# Patient Record
Sex: FEMALE | HISPANIC OR LATINO | URBAN - METROPOLITAN AREA
[De-identification: names, ages, dates, MRNs, and addresses within clinical notes are randomized per-mention and may not be internally consistent; named-entity substitution may affect disease eponyms.]

---

## 2023-03-23 ENCOUNTER — ULTRASOUND (OUTPATIENT)
Dept: OBGYN CLINIC | Facility: CLINIC | Age: 33
End: 2023-03-23

## 2023-03-23 VITALS
DIASTOLIC BLOOD PRESSURE: 78 MMHG | RESPIRATION RATE: 16 BRPM | HEIGHT: 60 IN | HEART RATE: 100 BPM | SYSTOLIC BLOOD PRESSURE: 120 MMHG | WEIGHT: 212 LBS | BODY MASS INDEX: 41.62 KG/M2

## 2023-03-23 DIAGNOSIS — N91.2 AMENORRHEA: Primary | ICD-10-CM

## 2023-03-23 NOTE — PROGRESS NOTES
Wendy 50  Name: Danny Yang  MRN: [id number]  : 1990      ASSESSMENT/PLAN:    #1  IUP at 9 weeks and 5 days  - Viable pregnancy on TVUS  - Prenatal panel, Hep C and 1h GTT ordered  - MFM referral placed  - RTC in 2 weeks for nurse intake visit      SUBJECTIVE:   29 y o   who presents for viability scan with LMP of 1/15/23  She is 9 weeks and 5 days by her LMP  She denies cramping or vaginal bleeding  This is a planned and welcomed pregnancy  Her previous pregnancy resulted in a  section  OBJECTIVE:  Vitals:    23 1023   BP: 120/78   BP Location: Right arm   Patient Position: Sitting   Cuff Size: Standard   Pulse: 100   Resp: 16   Weight: 96 2 kg (212 lb)   Height: 5' (1 524 m)       TVUS: viable, camargo IUP at 9 weeks 1 days with CRL 5 61cm    ANGELA Oct 27th 2023  Final dating via LMP                    Meche Vicente MD  OB/GYN PGY-3  3/23/2023  10:34 AM

## 2023-03-24 ENCOUNTER — TELEPHONE (OUTPATIENT)
Facility: HOSPITAL | Age: 33
End: 2023-03-24

## 2023-03-24 NOTE — TELEPHONE ENCOUNTER
Called patient to schedule MFM appointment, based on referral issued to Maternal Fetal Medicine by [facility] office  Left voicemail requesting patient to call back and schedule appointment, with office number for return call [phone]

## 2023-03-27 ENCOUNTER — INITIAL PRENATAL (OUTPATIENT)
Dept: OBGYN CLINIC | Facility: CLINIC | Age: 33
End: 2023-03-27

## 2023-03-27 VITALS
HEIGHT: 60 IN | WEIGHT: 214.2 LBS | HEART RATE: 98 BPM | BODY MASS INDEX: 42.05 KG/M2 | DIASTOLIC BLOOD PRESSURE: 78 MMHG | SYSTOLIC BLOOD PRESSURE: 115 MMHG

## 2023-03-27 DIAGNOSIS — O99.211 OBESITY AFFECTING PREGNANCY IN FIRST TRIMESTER: Primary | ICD-10-CM

## 2023-03-27 NOTE — LETTER
[facility] Letter    03/27/23        Dee Dee Kovacs  1990  [address]  [facility]       Francy Paz is a patient and under our care in our office  Dee Dee Kovacs's Estimated Date of Delivery: 10/22/2023  Any questions or concerns feel free to contact our office           Respectfully,    [address] [facility]  [id number] [place] [address]/Dary Richardson 15  [address] [facility]/[place]  [phone]   [facility]/[address]  [phone]

## 2023-03-27 NOTE — LETTER
Proof of Pregnancy Letter      03/27/23            Radha Saenz  1990  [address]  [facility]      Radha Saenz is a patient at our facility  Radha Saenz Estimated Date of Delivery: 10/22/2023  Any questions or concerns, please feel free to contact our office        Sincerely,      [facility]  [address], [address] [facility], [address]   Office:  [phone]

## 2023-03-27 NOTE — LETTER
Dentist Letter            03/27/23          Susanne Mediate  1990  [address]  [facility]  [facility]               We have had several requests from local dentist requesting permission to perform procedures on our patients who are pregnant  We wish to respond with this letter regarding some of the more routine procedures that we have been asked about  The following procedures may be performed on our obstetric patients:   1  Administration of local anesthesia   2  Administration of antibiotics such as PCN, Ampicillin, and Erythromycin  3  Administration of pain medications such as Tylenol, Tylenol with codeine, and if needed Percocet  4  Shielded X-rays    Should you have any questions, please do not hesitate to contact at [phone]          Sincerely,    [facility] Team  [phone]

## 2023-03-27 NOTE — LETTER
Work Letter    03/27/23      Ken Fraga  1990  [address] [address]    Dear Ken Fraga,      Your employee is a patient at [facility]   We recommend that all pregnant women:    1  Have a well-ventilated workspace  2  Wear low-heeled shoes  3  Work no more than 40 hours per week  4  Have a 15 minute break every 2 hours and at least 30 minutes for a meal break  5  Use good body mechanics by bending at your knees to avoid back strain and lift no more than 20 pounds without assistance  Will need assistance with lifting over 20 lbs  6  Have ready access to bathrooms and water  She may continue to work until her due date unless medical complications arise  We anticipate she may return to work in 6-8 weeks after delivery       Sincerely,  [facility]  [address], [address] [place] [address]  DeanDimitry   6   OFFICE:  [phone]    OR  [address] [facility], [id number] [place] [address]  OFFICE:  [phone]

## 2023-03-27 NOTE — PATIENT INSTRUCTIONS
7 [facility] would like to say Congratulations on your pregnancy! We are happy that you have chosen us to be your health care provider during your pregnancy  Your health, the health of your unborn child (children) and your family is important to us  Now, more than ever, your health will be most important to you  Your baby's growth and progress can be affected by how well you take care of yourself  It's a good idea to plan ahead  It is a known fact that women who receive care early in pregnancy and throughout their pregnancy have healthier babies  Visits will be scheduled for you throughout your pregnancy  It is your duty to keep your appointments and follow directions for your care  The number of visits will be decided by your doctor  Don't be afraid to ask questions  Talk with your nurses and providers about your plans for birth and any concerns you may have  Maternal Fetal Medicine (MFM) at [phone]   1 hour appointment, only 1 support person allowed, NO children    Blood work : Please complete prior to your H&P appointment  You do NOT have to fast for any blood work unless instructed  CBC, Blood type and antibody screen, Urinalysis, Random glucose level, HIV, Syphilis, Hepatitis B  History &Physical: H&P appointment   Physical exam  Pelvic exam: GC/CT testing  If needed: Pap smear  Routine prenatal   Visits every 4 weeks until 28 weeks    Stay healthy during your pregnancy    Eat a variety of healthy foods  Healthy foods include fruits, vegetables, whole-grain breads, low-fat dairy foods, beans, lean meats, and fish  Drink liquids as directed  Ask how much liquid to drink each day and which liquids are best for you  Caffeine: It is not clear how caffeine affects pregnancy  Limit your intake of caffeine to avoid possible health problems  Limit caffeine to less than 200 milligrams each day     Caffeine may be found in coffee, tea, cola, sports drinks, and chocolate  Foods that contain mercury:  Mercury is naturally found in almost all types of fish and shellfish  Some types of fish absorb higher levels of mercury that can be harmful to an unborn baby  Eat only fish and shellfish that are low in mercury  Limit your intake of fish to 2 servings each week  Choose fish low in mercury such as canned light tuna, shrimp, crab, salmon, cod, or tilapia  Do not  eat fish high in mercury such as swordfish, tilefish, aidan mackerel, and shark  Each week, you may eat up to 12 ounces of fish or shellfish that have low levels of mercury  These include shrimp, canned light tuna, salmon, pollock, and catfish  Eat only 6 ounces of albacore (white) tuna per week  Albacore tuna has more mercury than canned tuna  Take prenatal vitamins as directed  Your need for certain vitamins and minerals, such as folic acid, increases during pregnancy  Prenatal vitamins provide some of the extra vitamins and minerals you need  Prenatal vitamins may also help to decrease the risk of certain birth defects  Weight: Ask how much weight you should gain during your pregnancy  Too much or too little weight gain can be unhealthy for you and your baby  Exercise: Talk to your healthcare provider about exercise  Moderate exercise can help you stay fit  Your healthcare provider will help you plan an exercise program that is safe for you during pregnancy  Drink plenty of fluids while exercising to stay hydrated  Be careful to avoid overheating  AVOID exercises that can make you lose your balance  Activities that can put your baby at risk i e  horseback riding, scuba diving, skiing or snowboarding  After your first trimester, avoid exercises that require you to lay flat on your back  AVOID exceeding a heart rate greater than 140 beats per minute  As long as you are able to hold a conversation while exercising your heart rate is likely acceptable  ALWAYS wear your seat belt    The shoulder belt should lay across your chest (between your breasts) and away from your neck  Secure the lap belt below your belly so that it fits snugly across your hips and pelvic bone  Perform Kegel exercise  Imagine yourself stopping the flow of urine, parth the muscles of your pelvic floor  Hold that contraction for 10 seconds and release  They can be repeated 10-20 times per day  Do not smoke  If you smoke, it is never too late to quit  Smoking increases your risk of a miscarriage and other health problems during your pregnancy  Smoking can cause your baby to be born too early or weigh less at birth  Ask your healthcare provider for information if you need help quitting  Do not drink alcohol  Alcohol passes from your body to your baby through the placenta  It can affect your baby's brain development and cause fetal alcohol syndrome (FAS)  FAS is a group of conditions that causes mental, behavior, and growth problems  Alcohol:  Do not drink alcohol during pregnancy  Alcohol can increase your risk of a miscarriage (losing your baby)  Never use illegal or street drugs (such as marijuana or cocaine) while you are pregnant  Safety tips:  Avoid hot tubs and saunas  Do not use a hot tub or sauna while you are pregnant, especially during your first trimester  Hot tubs and saunas may raise your baby's temperature and increase the risk of birth defects  Avoid toxoplasmosis  This is an infection caused by eating raw meat or being around infected cat feces  It can cause birth defects, miscarriages, and other problems  Wash your hands after you touch raw meat  Make sure any meat is well-cooked before you eat it  Avoid raw eggs and unpasteurized milk  Use gloves or ask someone else to clean your cat's litter box while you are pregnant  Ask your healthcare provider about travel  The most comfortable time to travel is during the second trimester   Ask your healthcare provider if you can travel after 36 weeks  You may not be able to travel in an airplane after 36 weeks  He may also recommend that you avoid long road trips  Pregnancy Diet  WHAT YOU NEED TO KNOW:   What is a healthy diet during pregnancy? A healthy diet during pregnancy is a meal plan that provides the amount of calories and nutrients you need during pregnancy  Your body needs extra calories and nutrients to support your growing baby  You need to gain the right amount of weight for a healthy baby and pregnancy  Babies born at a healthy weight have a lower risk of certain health problems at birth and later in life  A healthy diet may help you avoid gaining too much weight  Too much weight gain may cause problems for you during your pregnancy and delivery  What should I AVOID while I am pregnant? Raw and undercooked foods: You should not eat undercooked or raw meat, poultry, eggs, fish, or shellfish (shrimp, crab, lobster)  Cook leftover foods and ready-to-eat foods such as hot dogs until they are steaming hot  Unpasteurized food:  Unpasteurized foods are foods that have not gone through the heating process (pasteurization) that destroys bacteria  You should not drink milk, juice, or cheese that has not been pasteurized  This includes Brie, feta, Camembert, blue, and [place] cheeses  Which foods can I eat while I am pregnant? Eat a variety of foods from each of the food groups listed below  Your dietitian will tell you how many servings you should have from each food group each day to get enough calories  The amount of calories you need depends on your daily activity, your weight before pregnancy, and current weight gain  Healthcare providers divide pregnancy into 3 periods of time called trimesters  In the first trimester, you usually do not need extra calories  In the second and third trimesters, most women should eat about 300 extra calories each day  What vitamin and mineral supplements may I need?   Your healthcare provider will tell you if you need a supplement and the type you should take  Talk to your healthcare provider before you take any other kind of supplement, including herbal (natural) supplements  Prenatal vitamins:  Eat a variety of healthy foods, even if you take a prenatal vitamin  If you forget to take your vitamin, do not take double the amount the next day  Folic acid:  You need at least 785 mcg of folic acid each day before you get pregnant  Folic acid helps to form your baby's brain and spinal cord in early pregnancy  During pregnancy, your daily need for folic acid increases to about 600 mcg  Get folic acid each day by eating citrus fruits and juices, green leafy vegetables, liver, or dried beans  Folic acid is also added to foods such as breakfast cereals, bread products, flour, and pasta  Iron:  Iron is a mineral the body needs to make hemoglobin, which is a part of red blood cells  Hemoglobin helps your blood carry oxygen from the lungs to the rest of your body  Foods that are good sources of iron are meat, poultry, fish, beans, spinach, and fortified cereals and breads  Your body will absorb iron better from non-meat sources if you have a source of vitamin C at the same time  Drink tea and coffee separately from iron-fortified foods and iron supplements  You need about 30 mg of iron each day during pregnancy  Calcium and vitamin D:  Women who do not eat dairy products may need a calcium and vitamin D supplement  Talk to your healthcare provider about calcium supplements if you do not regularly eat good sources of calcium  The amount of calcium you need is about 1,300 mg if you are between 15and 25years old and 1,000 mg if you are 23to 48years old  What other healthy guidelines should I follow? Vegetarians and vegans: If you are a vegetarian or vegan, get enough protein, vitamin B12, and iron during your pregnancy   Some non-meat sources of these nutrients are fortified cereals, nut butters, soy products (tofu and soymilk), nuts, grains, and legumes  These nutrients are also found in eggs and milk products  Cravings: You may have cravings for certain foods during your pregnancy  Foods that are high in calories, fat, and sugar should not replace healthy food choices  Some women have cravings for unusual substances such as tawana, dirt, laundry starch, ice, and chalk  This condition is called pica  These may lead to health problems such as anemia and cause other health problems  Nausea and Vomiting in Pregnancy  Nausea and vomiting in pregnancy  can happen any time of day  These symptoms usually start before the 9th week of pregnancy, and end by the 14th week (second trimester)  Some women can have nausea and vomiting for a longer time  These symptoms can affect some women throughout the entire pregnancy  Nausea and vomiting do not harm your baby  These symptoms can make it hard for you to do your daily activities  Seek care immediately if:   You have signs of dehydration  Examples are dark yellow urine, dry mouth and lips, dry  skin, fast heartbeat, and urinating less than usual   You have severe abdominal pain  You feel too weak or dizzy to stand up  You see blood in your vomit or bowel movements  Contact your healthcare provider if:   You vomit more than 4 times in 1 day  You have not been able to keep liquids down for more than 1 day  You lose more than 2 pounds  You have a fever  Your nausea and vomiting continue longer than 14 weeks  You have questions or concerns about your condition or care  Treatment  for nausea and vomiting in pregnancy is usually not needed  Talk to your healthcare provider if your symptoms do not decrease with the changes suggested below  You may need vitamin B6 and medicine if these changes do not help, or your symptoms become severe  Nutrition changes you can make to manage nausea and vomiting:   Eat small meals throughout the day instead of 3 large meals  You may be more likely to have nausea and vomiting when your stomach is empty  Eat foods that are low in fat and high in protein  Examples are lean meat, beans, turkey, and chicken without the skin  Eat a small snack, such as crackers, dry cereal, or a small sandwich before you go to bed  Eat some crackers or dry toast before you get out of bed in the morning  Get out of bed slowly  Sudden movements could cause you to get dizzy and nauseated  Eat bland foods when you feel nauseated  Examples of bland foods include dry toast, dry cereal, plain pasta, white rice, and bread  Other bland foods include saltine crackers, bananas, gelatin, and pretzels  Avoid spicy, greasy, and fried foods  Avoid any other foods that make you feel nauseated  Drink liquids that contain ginger  Drink ginger ale made with real ginger or ginger tea made with fresh grated ginger  Ginger capsules or ginger candies may also help to decrease nausea and vomiting  Drink liquids between meals instead of with meals  Wait at least 30 minutes after you eat to drink liquids  Drink small amounts of liquids often throughout the day to prevent dehydration  Ask how much liquid you should drink each day  Other changes you can make to manage nausea and vomiting:   Avoid smells that bother you  Strong odors may cause nausea and vomiting to start, or make it worse  Take a short walk, turn on a fan, or try to sleep with the window open to get fresh air  When you are cooking, open windows to get rid of smells that may cause nausea  Do not brush your teeth right after you eat  if it makes you nauseated  Rest when you need to  Start activity slowly and work up to your usual routine as you start to feel better  Talk to your healthcare provider about your prenatal vitamins  Prenatal vitamins can cause nausea for some women  Try taking your prenatal vitamin at night or with a snack   If this change does not help, your healthcare provider may recommend a different type of vitamin  Do not use any medicines, vitamins, or supplements to manage your symptoms without asking your healthcare provider  Many medicines can harm an unborn baby  Light to moderate exercise  may help to decrease your symptoms  It may also help you to sleep better at night  Ask your healthcare provider about the best exercise plan for you  Breastfeeding   Benefits of breastfeeding  Are less likely to develop allergies  Have increased protection against bacterial meningitis  Have fewer middle ear infections  Have fewer learning disabilities  Have fewer behavioral difficulties  Have higher IQ's  Have lower rates of respiratory illness  Have lower obesity  Are less likely to develop juvenile diabetes and some childhood cancers  Are less likely to die from Sudden Infant Death Syndrome  A healthier baby means fewer visits to the doctor and the pharmacy  Breastfeeding is environmentally friendly  There is nothing to throw away  Breastfeeding is free; formula can cost over $1000 for the first year of life  There is less vaginal bleeding immediately after delivery and a faster return to your pre-pregnant size  Mothers who breastfeed a lifetime total of 2 years have:  A 40% decreased risk of breast cancer   A decreased risk of ovarian cancer  Lower rates of osteoporosis, diabetes and heart disease  Importance of exclusive breastfeeding  By providing the ideal food for the healthy growth and development of infants, exclusive  infants receive only breast milk  Exclusive breastfeeding for the first 6 months is very important to improve an infant's health and reduce childhood illness  Exclusive breastfeeding for the first 6 months  The American Academy of Pediatrics recommends exclusive breastfeeding for the first six months and continued breastfeeding with supplementation of solids for the next 6 months       Early initiation of breastfeeding  Women who feed their infants within the first hour of birth is referred to as Meghan Babb initiation of breastfeeding” and ensures that the  receives colostrum  Colostrum is rich in antibodies and essential nutrients  Rooming-In  May stabilize 's breathing and heart rate  Reduce crying  Improve ability for  to maintain temperature and blood sugar levels  Early stimulation of baby's immune system  Calming effects  Easier and faster bonding   Rooming-in promotes getting to know your   Rooming-in makes breastfeeding easier  Women who room-in with their newborns make more milk and produce a good milk supply earlier  Becomes easier to recognize baby's feeding cues  Infants have longer breastfeeding sessions  Women who room-in with their newborns are more likely to exclusively breastfeed compared to women who are  from their newborns  Skin-to-Skin  Skin to skin contact should happen regardless of a woman's feeding preference or the mode of delivery  After the delivery of your baby, it's important that a healthy mother and her baby have uninterrupted skin-to-skin contact  Skin to skin contact should occur immediately after birth for a least one-two hours and until after the first feeding for breastfeeding mothers  Skin-to-skin contact means placing dried, unclothes newborns on their mother's bare chest, with warm blankets covering the baby's back  All routine procedures such as maternal and  assessments can take place during skin-to-skin contact or can be delayed until after the sensitive period immediately after birth  We are proud to offer extensive educational and support services to encourage mothers to breastfeed  This service offers information, education, and support for women who want to breast feed  Mothers can leave a message or breastfeeding question on the line anytime of the day  Nurses will respond within 72 hours  The Breast Feeding Answer Line is [phone] ([phone])   Please DO NOT leave urgent or emergent messages on this message line  Please DO contact your physician DIRECTLY if you have any urgent questions or concerns  In the event of a suspected emergency medical condition please CALL 911 or [address]      Attaching your baby at the Breast (English): [email or web address]    Attaching your baby at the Breast (Japanese):  [email or web address]        Coronavirus (COVID-19) pregnancy FAQs: Medical experts answer your questions  From [employer] com cy  [email or web address]-covid-19-pregnancy-faq-medical-yidpxle-iwxzch-[email or web address]  bc (courtesy of [facility])  As of 3/18/20  By Eugenio Blair 41  Medically reviewed by Society for Maternal-Fetal Medicine       We asked parents-to-be to send us their most pressing questions about coronavirus (COVID-19)  Among them: Is it still safe to deliver in a hospital? What if my ob-gyn has the virus? We sent those questions to the top docs at the Society for Maternal-Fetal Medicine  Here are their answers  The coronavirus (COVID-19) pandemic has been declared a national emergency in the [facility] by [employer]  Moms-to-be like you are concerned about everything from getting medicines to managing disruptions at work  But above and beyond any worry about lifestyle changes is a focus on your health and the impact of COVID-19 on your pregnancy  We asked obstetrics doctors who handle the most complicated pregnancy issues to answer your questions about the coronavirus  Here are their responses, provided by Dr Justino Ardon and her colleagues at the Society for [place] [address]  Am I at more risk for COVID-19 if I'm pregnant? We don't know   Pregnancy does change your immune system in ways that might make you more susceptible to viral respiratory infections like COVID-19  If you become infected, you might also be at higher risk for more severe illness compared to the general population  Although this does not appear to be the case with COVID-19, based on limited data so far, a higher risk has been true for pregnant women with other coronavirus infections (SARS-CoV and MERS-CoV) and other viral respiratory infections, such as flu  It's important to take preventive actions to avoid infection, such as washing your hands often and avoiding people who are sick  How might coronavirus affect my pregnancy? Again, we don't know  Women with other coronavirus infections (such as SARS-CoV) did not have miscarriage or stillbirth at higher rates than the general population  We know that having other respiratory viral infections during pregnancy, such as flu, has been associated with problems like low birth weight and  birth  Also, having a high fever early in pregnancy may increase the risk of certain birth defects  Could I transmit coronavirus to my baby during pregnancy or delivery? We don't know whether you could transmit COVID-19 to your baby before or during delivery  However, among the few case studies of infants born to mothers with COVID-23 published in peer-reviewed literature, none of the infants tested positive for the virus  Also, there was no virus detected in samples of amniotic fluid or breast milk  There have been a few reports of newborns as young as a few days old with infection, suggesting that a mother can transmit the infection to her infant through close contact after delivery  There have been no reports of mother-to-baby transmission for other coronaviruses (MERS-CoV and SARS-CoV), although only limited information is available  Is it safe for me to deliver at a hospital where there have been COVID-19 cases? Yes  We know that COVID-19 is a very scary virus   The good news is that hospitals are taking great precautions to keep patients and healthcare providers safe  When a patient is even suspected to have COVID-19, they are placed in a negative pressure room  (Think of these rooms as vacuums that suck and filter the air so it's safe for the other people in the hospital)  This makes it possible for you to deliver at the hospital without putting you or your baby at risk  Hospitals are also implementing stricter visiting policies to keep patients safe  It's worth calling your hospital to check if there are any new regulations to be aware of  What plans should I make now in case the hospital system is overwhelmed when it's time for me to deliver? This is a great question to talk with your doctor or midwife about when you're 34 to 36 weeks pregnant  Every hospital is making different plans for dealing with this scenario  I work in healthcare  Should I ask my doctor to excuse me from work until the baby is born? What if I work in a school, the travel [place], or some other high-risk setting? Healthcare facilities should take care to limit the exposure of pregnant employees to patients with confirmed or suspected COVID-19, just as they would with other infectious cases  If you continue working, be sure to follow the CDC's risk assessment and infection control guidelines  If you work in a school, travel [place], or other high-risk setting, talk with your employer about what it's doing to protect employees and minimize infection risks  Wash your hands often  What if my OB gets COVID-19? If your doctor or midwife tests positive for COVID-19, they will need to be quarantined until they recover and are no longer at risk of transmitting the virus  In this case, you'll be assigned to another OB in your doctor's practice (or you may choose another practitioner yourself)  Ask your new OB or your doctor's office if you should self-quarantine or be tested for the virus   (It will depend on when you last saw your provider and when that person tested positive )    Should we hold off on trying to conceive because of COVID-19? At this time, there's no reason to hold off on trying to get pregnant, but the data we have is really limited  For example, we don't think the virus causes birth defects or increases your risk of miscarriage  But we don't know whether you could transmit COVID-19 to your baby before or during delivery  We also don't know if the virus lives in semen or can be sexually transmitted  We have a babymoon scheduled in the next few months - should we cancel? If you're planning to travel internationally or on a cruise, you should strongly consider canceling  At this time, the virus has reached more than 140 countries, and there are travel bans to [place], most of [place], and [place]  Places where large numbers of people gather are at highest risk, especially airports and cruise ships  If you're planning travel in the [place] , note that any travel setting increases your risk of exposure, and there may be travel bans even in [facility] by the time you're scheduled to go  Even if you're state is not blocked, you'll definitely want to avoid traveling to communities where the virus is spreading  To find out where these places are, check The [place] Times map based on CDC data  For the most current advice to help you avoid exposure, check the CDC's COVID-19 travel page  Will the hospital separate me from my  and keep the baby in quarantine? If you test positive for COVID-19 or have been exposed but have no symptoms, the CDC, [employer] of Obstetricians and Gynecologists, and the Society for [employer] all recommend that you be  from your baby to decrease the risk of transmission to the baby  This would last until you are no longer at risk of transmitting the virus   If you do not have COVID-19 and have not been exposed to the virus, the hospital will not separate you from your   My hospital is restricting visitors and only allowing one support person  If my support person leaves after the delivery, will they be allowed to come back? Every hospital has different policies  Contact your hospital or labor and delivery unit a week or so before delivery to get the most up-to-date restrictions  In general, if your support person needs to leave, they would be allowed back unless they knew they were exposed to COVID-19 after leaving your company  Sree understands that the coronavirus (COVID-19) pandemic is an evolving story and that your questions will change over time  We'll continue asking moms and dads in our Community what they want to know, and we'll get the answers from experts to keep them - and you - informed and supported  My mom was planning to fly here to help me care for my new baby after delivery  Should I tell her not to come? Yes  If your mom is over 61 or has any serious chronic medical conditions (such as heart disease, lung disease, or diabetes), she is at higher risk of serious illness from COVID-19 and should avoid air travel  And remember that any travel setting increases a person's risk of exposure  So, it may be risky to have her around the baby after she has been traveling  For the most current advice on traveling, check the CDC's COVID-19 travel page  Sree understands that the coronavirus pandemic is an evolving story and that your questions will change over time  We'll continue asking moms and dads in our Community what they want to know, and we'll get the answers from experts to keep them - and you - informed and supported

## 2023-03-27 NOTE — PROGRESS NOTES
OB INTAKE INTERVIEW  Pt presents for OB intake  [id number]  OB History    Para Term  AB Living   4 1 1   2 1   SAB IAB Ectopic Multiple Live Births     1 1   1      # Outcome Date GA Lbr Eliazar/2nd Weight Sex Delivery Anes PTL Lv   4 Current            3 Term 19    F CS-Unspec   JACKIE   2 IAB            1 Ectopic 2014             Hx of  delivery prior to 36 weeks 6 days:  No   If yes, place a referral for cervical surveillance at 16 weeks  Last Menstrual Period:    Patient's last menstrual period was 01/15/2023 (exact date)  Ultrasound date: 3/23/2023  9 weeks 1 days     Estimated Date of Delivery: 10/22/2023 by LMP  H&P visit scheduled  2023 @ 1115  with Dr Anu Escalante     Last pap smear: unknown date      Current Issues:  Constipation :   Yes, denied blood  RN educated pt on dietary changes   Headaches :   No  Cramping:  No  Spotting :   No  PICA cravings :  No  FOB Involved:   Yes  Planned pregnancy:  Yes  I have these concerns about this prenatal patient:   Obesity affecting pregnancy - Referral placed for Nutritionist   History of Depression - referral placed for [facility]     Interview education  • [facility] Pregnancy Essentials reviewed and discussed   • Baby and [address] Handout  • [facility] MFM Handout  • Discussed genetic testing - pt is interested at this time   • Prenatal lab work: Scripts printed and given to pt  • Influenza vaccine given today: No, pt refused   • Discussed COVID vaccine - per pt received 2 doses of Pfizer   • Discussed Tdap vaccine  Immunizations: There is no immunization history on file for this patient  Depression Screening Follow-up Plan: Patient's depression screening was negative with an Revillo score of  8  Patient advised to follow-up with PCP for further management  Nurse/Family Partnership- referral placed:  No   If yes, place referral for nurse family partnership  BMI Counseling: Body mass index is 41 83 kg/m²   is above normal  Patient referred to nutritionist due to patient being overweight  Tobacco Cessation Counseling: non-smoker  The numerous health risks of tobacco consumption were discussed  Infection Screening: Does the pt have a hx of MRSA? No  If yes- please follow MRSA protocol and obtain a nasal swab for MRSA culture  The patient was oriented to our practice and all questions were answered    Interviewed by: Yogi Noguera RN 03/27/23

## 2023-04-04 ENCOUNTER — APPOINTMENT (OUTPATIENT)
Dept: LAB | Facility: CLINIC | Age: 33
End: 2023-04-04

## 2023-04-04 DIAGNOSIS — N91.2 AMENORRHEA: ICD-10-CM

## 2023-04-04 LAB
ABO GROUP BLD: NORMAL
BACTERIA UR QL AUTO: ABNORMAL /HPF
BASOPHILS # BLD AUTO: 0.02 THOUSANDS/ÂΜL (ref 0–0.1)
BASOPHILS NFR BLD AUTO: 0 % (ref 0–1)
BILIRUB UR QL STRIP: NEGATIVE
BLD GP AB SCN SERPL QL: NEGATIVE
CLARITY UR: CLEAR
COLOR UR: ABNORMAL
EOSINOPHIL # BLD AUTO: 0.04 THOUSAND/ÂΜL (ref 0–0.61)
EOSINOPHIL NFR BLD AUTO: 0 % (ref 0–6)
ERYTHROCYTE [DISTWIDTH] IN BLOOD BY AUTOMATED COUNT: 13 % (ref 11.6–15.1)
GLUCOSE 1H P 50 G GLC PO SERPL-MCNC: 155 MG/DL (ref 40–134)
GLUCOSE UR STRIP-MCNC: ABNORMAL MG/DL
HBV SURFACE AG SER QL: NORMAL
HCT VFR BLD AUTO: 38.5 % (ref 34.8–46.1)
HCV AB SER QL: NORMAL
HGB BLD-MCNC: 13.6 G/DL (ref 11.5–15.4)
HGB UR QL STRIP.AUTO: NEGATIVE
HIV 1+2 AB+HIV1 P24 AG SERPL QL IA: NORMAL
HIV 2 AB SERPL QL IA: NORMAL
HIV1 AB SERPL QL IA: NORMAL
HIV1 P24 AG SERPL QL IA: NORMAL
HYALINE CASTS #/AREA URNS LPF: ABNORMAL /LPF
IMM GRANULOCYTES # BLD AUTO: 0.04 THOUSAND/UL (ref 0–0.2)
IMM GRANULOCYTES NFR BLD AUTO: 0 % (ref 0–2)
KETONES UR STRIP-MCNC: NEGATIVE MG/DL
LEUKOCYTE ESTERASE UR QL STRIP: NEGATIVE
LYMPHOCYTES # BLD AUTO: 2.42 THOUSANDS/ÂΜL (ref 0.6–4.47)
LYMPHOCYTES NFR BLD AUTO: 22 % (ref 14–44)
MCH RBC QN AUTO: 31.9 PG (ref 26.8–34.3)
MCHC RBC AUTO-ENTMCNC: 35.3 G/DL (ref 31.4–37.4)
MCV RBC AUTO: 90 FL (ref 82–98)
MONOCYTES # BLD AUTO: 0.42 THOUSAND/ÂΜL (ref 0.17–1.22)
MONOCYTES NFR BLD AUTO: 4 % (ref 4–12)
MUCOUS THREADS UR QL AUTO: ABNORMAL
NEUTROPHILS # BLD AUTO: 8.03 THOUSANDS/ÂΜL (ref 1.85–7.62)
NEUTS SEG NFR BLD AUTO: 74 % (ref 43–75)
NITRITE UR QL STRIP: NEGATIVE
NON-SQ EPI CELLS URNS QL MICRO: ABNORMAL /HPF
NRBC BLD AUTO-RTO: 0 /100 WBCS
PH UR STRIP.AUTO: 6 [PH]
PLATELET # BLD AUTO: 318 THOUSANDS/UL (ref 149–390)
PMV BLD AUTO: 10.3 FL (ref 8.9–12.7)
PROT UR STRIP-MCNC: ABNORMAL MG/DL
RBC # BLD AUTO: 4.26 MILLION/UL (ref 3.81–5.12)
RBC #/AREA URNS AUTO: ABNORMAL /HPF
RH BLD: POSITIVE
SP GR UR STRIP.AUTO: 1.02 (ref 1–1.03)
SPECIMEN EXPIRATION DATE: NORMAL
TREPONEMA PALLIDUM IGG+IGM AB [PRESENCE] IN SERUM OR PLASMA BY IMMUNOASSAY: NORMAL
UROBILINOGEN UR STRIP-ACNC: <2 MG/DL
WBC # BLD AUTO: 10.97 THOUSAND/UL (ref 4.31–10.16)
WBC #/AREA URNS AUTO: ABNORMAL /HPF

## 2023-04-05 ENCOUNTER — PATIENT OUTREACH (OUTPATIENT)
Dept: OBGYN CLINIC | Facility: CLINIC | Age: 33
End: 2023-04-05

## 2023-04-05 LAB — RUBV IGG SERPL IA-ACNC: 75.8 IU/ML

## 2023-04-06 ENCOUNTER — ROUTINE PRENATAL (OUTPATIENT)
Dept: OBGYN CLINIC | Facility: CLINIC | Age: 33
End: 2023-04-06

## 2023-04-06 VITALS
HEART RATE: 106 BPM | HEIGHT: 60 IN | WEIGHT: 211 LBS | SYSTOLIC BLOOD PRESSURE: 106 MMHG | RESPIRATION RATE: 18 BRPM | BODY MASS INDEX: 41.43 KG/M2 | DIASTOLIC BLOOD PRESSURE: 72 MMHG

## 2023-04-06 DIAGNOSIS — O99.211 OBESITY AFFECTING PREGNANCY IN FIRST TRIMESTER: Primary | ICD-10-CM

## 2023-04-06 DIAGNOSIS — Z98.891 HISTORY OF CESAREAN SECTION: ICD-10-CM

## 2023-04-06 DIAGNOSIS — Z34.91 PRENATAL CARE IN FIRST TRIMESTER: ICD-10-CM

## 2023-04-06 DIAGNOSIS — R73.09 ELEVATED GLUCOSE TOLERANCE TEST: ICD-10-CM

## 2023-04-06 PROBLEM — Z3A.11 11 WEEKS GESTATION OF PREGNANCY: Status: ACTIVE | Noted: 2023-04-06

## 2023-04-06 LAB — BACTERIA UR CULT: NORMAL
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
## 2023-04-07 LAB
C TRACH DNA SPEC QL NAA+PROBE: NEGATIVE
N GONORRHOEA DNA SPEC QL NAA+PROBE: NEGATIVE

## 2023-04-19 ENCOUNTER — APPOINTMENT (OUTPATIENT)
Dept: LAB | Facility: CLINIC | Age: 33
End: 2023-04-19

## 2023-04-19 DIAGNOSIS — Z33.1 PREGNANT STATE, INCIDENTAL: ICD-10-CM

## 2023-04-19 DIAGNOSIS — Z36.9 UNSPECIFIED ANTENATAL SCREENING: ICD-10-CM

## 2023-04-19 PROBLEM — O09.299 H/O MACROSOMIA IN INFANT IN PRIOR PREGNANCY, CURRENTLY PREGNANT: Status: ACTIVE | Noted: 2023-04-19

## 2023-04-19 PROBLEM — R19.03 ABDOMINAL MASS, RLQ (RIGHT LOWER QUADRANT): Status: ACTIVE | Noted: 2023-04-19

## 2023-04-19 PROBLEM — O28.3 NUCHAL FOLD THICKENING ON PRENATAL ULTRASOUND: Status: ACTIVE | Noted: 2023-04-19

## 2023-04-19 PROBLEM — O99.210 MATERNAL MORBID OBESITY, ANTEPARTUM (HCC): Status: ACTIVE | Noted: 2023-04-19

## 2023-04-19 PROBLEM — E66.01 MATERNAL MORBID OBESITY, ANTEPARTUM (HCC): Status: ACTIVE | Noted: 2023-04-19

## 2023-04-24 LAB — MISCELLANEOUS LAB TEST RESULT: NORMAL

## 2023-05-02 PROBLEM — Z3A.16 16 WEEKS GESTATION OF PREGNANCY: Status: ACTIVE | Noted: 2023-04-06

## 2023-05-02 NOTE — PROGRESS NOTES
Assessment & Plan  29 y o  [id number] at 15w4d presenting for routine prenatal visit  Problem List        Other    Elevated glucose tolerance test    Overview     Elevated 1 hour  3 hour GTT and A1C ordered         History of  section    Overview     Reports history of  section due to breech presentation s/p failed ECV  Reports her daughter weighed nearly 10lbs  Would consider TOLAC if baby was not measuring big, otherwise open to RLTCS         16 weeks gestation of pregnancy    Overview     Prior CS, considering TOLAC depending on size of baby  Contraception: considering vasectomy         Maternal morbid obesity, antepartum ([address] )    H/O macrosomia in infant in prior pregnancy, currently pregnant    Nuchal fold thickening on prenatal ultrasound    Overview     NIPS low risk         Abdominal mass, RLQ (right lower quadrant)    Overview     6-7 cm simple cystic mass seen adjacent to right adnexa at time of NT  Abd US ordered            ____________________________________________________________  Subjective  She is without complaint  She denies contractions, loss of fluid, or vaginal bleeding  She feels regular fetal movements  Objective  /75 (BP Location: Left arm, Patient Position: Sitting, Cuff Size: Adult)   Ht 5' (1 524 m)   Wt 96 3 kg (212 lb 6 4 oz)   LMP 01/15/2023 (Exact Date)   BMI 41 48 kg/m²   FHR: 146 bpm by ultrasound    Physical Exam  Constitutional:       Appearance: Normal appearance  HENT:      Head: Normocephalic and atraumatic  Cardiovascular:      Rate and Rhythm: Normal rate  Pulmonary:      Effort: Pulmonary effort is normal  No respiratory distress  Abdominal:      Palpations: Abdomen is soft  Tenderness: There is no abdominal tenderness  Musculoskeletal:         General: Normal range of motion  Neurological:      Mental Status: She is alert  Skin:     General: Skin is warm and dry            Patient's Active Problem List  Patient Active Problem List   Diagnosis    Elevated glucose tolerance test    History of  section    16 weeks gestation of pregnancy    Maternal morbid obesity, antepartum ([address] )    H/O macrosomia in infant in prior pregnancy, currently pregnant    Nuchal fold thickening on prenatal ultrasound    Abdominal mass, RLQ (right lower quadrant)           Billy Nguyen MD  OB/GYN PGY-4  2023  10:45 AM

## 2023-05-04 ENCOUNTER — ROUTINE PRENATAL (OUTPATIENT)
Dept: OBGYN CLINIC | Facility: CLINIC | Age: 33
End: 2023-05-04

## 2023-05-04 VITALS
SYSTOLIC BLOOD PRESSURE: 116 MMHG | WEIGHT: 212.4 LBS | HEIGHT: 60 IN | BODY MASS INDEX: 41.7 KG/M2 | DIASTOLIC BLOOD PRESSURE: 75 MMHG

## 2023-05-04 DIAGNOSIS — R73.09 ELEVATED GLUCOSE TOLERANCE TEST: ICD-10-CM

## 2023-05-04 DIAGNOSIS — Z3A.16 16 WEEKS GESTATION OF PREGNANCY: Primary | ICD-10-CM

## 2023-05-04 DIAGNOSIS — O28.3 NUCHAL FOLD THICKENING ON PRENATAL ULTRASOUND: ICD-10-CM

## 2023-05-04 DIAGNOSIS — R19.03 ABDOMINAL MASS, RLQ (RIGHT LOWER QUADRANT): ICD-10-CM

## 2023-05-04 DIAGNOSIS — Z98.891 HISTORY OF CESAREAN SECTION: ICD-10-CM

## 2023-06-01 ENCOUNTER — ROUTINE PRENATAL (OUTPATIENT)
Dept: OBGYN CLINIC | Facility: CLINIC | Age: 33
End: 2023-06-01

## 2023-06-01 VITALS
BODY MASS INDEX: 41.94 KG/M2 | WEIGHT: 213.6 LBS | DIASTOLIC BLOOD PRESSURE: 64 MMHG | SYSTOLIC BLOOD PRESSURE: 112 MMHG | HEIGHT: 60 IN

## 2023-06-01 DIAGNOSIS — O28.3 NUCHAL FOLD THICKENING ON PRENATAL ULTRASOUND: Primary | ICD-10-CM

## 2023-06-01 DIAGNOSIS — R19.03 ABDOMINAL MASS, RLQ (RIGHT LOWER QUADRANT): ICD-10-CM

## 2023-06-01 DIAGNOSIS — Z3A.19 19 WEEKS GESTATION OF PREGNANCY: ICD-10-CM

## 2023-06-01 NOTE — PROGRESS NOTES
[facility]  OB/GYN prenatal visit    S: 29 y o  [id number] 19w4d here for PN visit  She has no obstetric complaints, including pelvic pain, contractions, vaginal bleeding, loss of fluid, or decreased fetal movement       O:  Vitals:    23 1114   BP: 112/64       Gen: no acute distress, nonlabored breathing  Fundal Height (cm): 20 cm  Fetal Heart Rate: 142    A/P:      IUP at 19w4d  No obstetric complaints toda  Ultrasound 2023, thickened nuchal fold but low risk NIPT, ultrasound 2023  Reviewed MSAFP- pt declines  Right adnexa 6-7 cm mass-abdominal wall ultrasound was ordered for further assessment-needs to complete  Early 1 hour GTT, 3 hour GTT- needs to complete  Discussed  labor precautions and fetal kick counts    Return to clinic in 4 weeks    Problem List        Other    Elevated glucose tolerance test    Overview     Elevated 1 hour  3 hour GTT and A1C ordered         History of  section    Overview     Reports history of  section due to breech presentation s/p failed ECV  Reports her daughter weighed nearly 10lbs  Would consider TOLAC if baby was not measuring big, otherwise open to RLTCS         19 weeks gestation of pregnancy    Overview     Prior CS, considering TOLAC depending on size of baby  Contraception: considering vasectomy  Ultrasound on 2023, thickened nuchal fold but low risk NIPT  Declines MSAFP  To complete abdominal ultrasound for 6-7 cm abdominal mass  1 hour  , needs to complete 3-hour GTT  Breast feeding         Maternal morbid obesity, antepartum ([address] )    H/O macrosomia in infant in prior pregnancy, currently pregnant    Nuchal fold thickening on prenatal ultrasound    Overview     NIPS low risk         Abdominal mass, RLQ (right lower quadrant)    Overview     6-7 cm simple cystic mass seen adjacent to right adnexa at time of NT  Abd US ordered              KIM Mcmahon  2023  11:39 AM

## 2023-06-09 ENCOUNTER — HOSPITAL ENCOUNTER (OUTPATIENT)
Dept: RADIOLOGY | Facility: HOSPITAL | Age: 33
Discharge: HOME | End: 2023-06-09

## 2023-06-09 ENCOUNTER — TELEPHONE (OUTPATIENT)
Facility: HOSPITAL | Age: 33
End: 2023-06-09

## 2023-06-09 DIAGNOSIS — R19.03 ABDOMINAL MASS, RLQ (RIGHT LOWER QUADRANT): ICD-10-CM

## 2023-06-09 DIAGNOSIS — Z3A.13 13 WEEKS GESTATION OF PREGNANCY: ICD-10-CM

## 2023-06-09 NOTE — TELEPHONE ENCOUNTER
Spoke with Rosa Fontanez at [facility] U/S department  She called in regards to a mutual PT who went there to have an U/S done on an abdominal mass  I told Rosa Fontanez that I was unsure of what the U/S was for and she stated they would not be able to complete the U/S due to the PT being too far along  I reached out to Dr Anatoliy Thakkar after the phone call and she said she was going to call the department to clarify what the U/S was for

## 2023-06-13 ENCOUNTER — DOCUMENTATION (OUTPATIENT)
Dept: PERINATAL CARE | Facility: CLINIC | Age: 33
End: 2023-06-13

## 2023-06-14 NOTE — PROGRESS NOTES
Called billing to check why patient was getting bills since patient thought that it was because we were out of network with her insurance, Spoke to [employer] and she said that it was because of her deductible and co insurance  I did checked in [address] [place] [address] and she does have a yearly deductible of $1500 and a 20% [employer]  I called patient to inform her she was a little confused, I told her that her Co insurance is that she pays 20% of her bill and her insurance pays 80 % after her deductible  I gave her billing's phone number for her to call them and make arrangements for payment and to get better information from them

## 2023-06-29 ENCOUNTER — ROUTINE PRENATAL (OUTPATIENT)
Facility: HOSPITAL | Age: 33
End: 2023-06-29
Payer: COMMERCIAL

## 2023-06-29 ENCOUNTER — TELEPHONE (OUTPATIENT)
Facility: HOSPITAL | Age: 33
End: 2023-06-29

## 2023-06-29 VITALS
BODY MASS INDEX: 42.84 KG/M2 | SYSTOLIC BLOOD PRESSURE: 100 MMHG | DIASTOLIC BLOOD PRESSURE: 60 MMHG | WEIGHT: 218.2 LBS | HEART RATE: 126 BPM | HEIGHT: 60 IN

## 2023-06-29 DIAGNOSIS — O09.299 H/O MACROSOMIA IN INFANT IN PRIOR PREGNANCY, CURRENTLY PREGNANT: ICD-10-CM

## 2023-06-29 DIAGNOSIS — R19.03 ABDOMINAL MASS, RLQ (RIGHT LOWER QUADRANT): ICD-10-CM

## 2023-06-29 DIAGNOSIS — Z36.3 ENCOUNTER FOR ANTENATAL SCREENING FOR MALFORMATION: ICD-10-CM

## 2023-06-29 DIAGNOSIS — E66.01 MATERNAL MORBID OBESITY, ANTEPARTUM (HCC): ICD-10-CM

## 2023-06-29 DIAGNOSIS — O28.3 NUCHAL FOLD THICKENING ON PRENATAL ULTRASOUND: ICD-10-CM

## 2023-06-29 DIAGNOSIS — Z98.891 HISTORY OF CESAREAN SECTION: ICD-10-CM

## 2023-06-29 DIAGNOSIS — Z36.86 ENCOUNTER FOR ANTENATAL SCREENING FOR CERVICAL LENGTH: ICD-10-CM

## 2023-06-29 DIAGNOSIS — O28.3 INCREASED NUCHAL TRANSLUCENCY SPACE ON FETAL ULTRASOUND: ICD-10-CM

## 2023-06-29 DIAGNOSIS — Z3A.23 23 WEEKS GESTATION OF PREGNANCY: Primary | ICD-10-CM

## 2023-06-29 DIAGNOSIS — O99.210 MATERNAL MORBID OBESITY, ANTEPARTUM (HCC): ICD-10-CM

## 2023-06-29 PROCEDURE — 76817 TRANSVAGINAL US OBSTETRIC: CPT | Performed by: OBSTETRICS & GYNECOLOGY

## 2023-06-29 PROCEDURE — 76811 OB US DETAILED SNGL FETUS: CPT | Performed by: OBSTETRICS & GYNECOLOGY

## 2023-06-29 NOTE — PROGRESS NOTES
Ultrasound Probe Disinfection    A transvaginal ultrasound was performed  Prior to use, disinfection was performed with High Level Disinfection Process (Trophon)  Probe serial number [id number]: [id number] was used        Francisco Arceo  06/29/23  11:10 AM

## 2023-06-29 NOTE — TELEPHONE ENCOUNTER
Called central scheduling  Explained the situation to gentleman on phone  Rescheduled patient for July 5, 2023 at 9 am, with arrival ytime of 8:45 am at the [facility] main site located at [address]  Caller stated nothing to eat or drink after midnight  Avoid gassy and carbonated drinks the day prior  Called patient and advised of new date, time and location for abdominal ultrasound of RLQ abdominal mass

## 2023-06-29 NOTE — PATIENT INSTRUCTIONS
Thank you for choosing us for your  care today  If you have any questions about your ultrasound or care, please do not hesitate to contact us or your primary obstetrician  Some general instructions for your pregnancy are:    Protect against coronavirus: get vaccinated - pregnant women are increased risk of severe COVID  Notify your primary care doctor if you have any symptoms  Exercise: Aim for 22 minutes per day (150 minutes per week) of regular exercise  Walking is great! Nutrition: aim for calcium-rich and iron-rich foods as well as healthy sources of protein  Learn about Preeclampsia: preeclampsia is a common, serious high blood pressure complication in pregnancy  A blood pressure of 145YNTT (systolic or top number) or 39MSLU (diastolic or bottom number) is not normal and needs evaluation by your doctor  Aspirin is sometimes prescribed in early pregnancy to prevent preeclampsia in women with risk factors - ask your obstetrician if you should be on this medication  If you smoke, try to reduce how many cigarettes you smoke or try to quit completely  Do not vape  Other warning signs to watch out for in pregnancy or postpartum: chest pain, obstructed breathing or shortness of breath, seizures, thoughts of hurting yourself or your baby, bleeding, a painful or swollen leg, fever, or headache (see [employer] POST-BIRTH Warning Signs campaign)  If these happen call 911  Itching is also not normal in pregnancy and if you experience this, especially over your hands and feet, potentially worse at night, notify your doctors

## 2023-06-29 NOTE — PROGRESS NOTES
"[address]: Mary Mathew was seen today for anatomic survey and cervical length screening ultrasound  See ultrasound report under \"OB Procedures\" tab  The time spent on this established patient on the encounter date included 10 minutes previsit service time reviewing records and precharting, 10 minutes face-to-face service time counseling regarding results and coordinating care, and  5 minutes charting, totalling 25 minutes    Please don't hesitate to contact our office with any concerns or questions   -Chris Wells MD      "

## 2023-06-29 NOTE — TELEPHONE ENCOUNTER
----- Message from Shaheen Marrero MD sent at 6/29/2023 12:50 PM EDT -----  Regarding: Help with radiology imaging? ? Lianne Fisher! I was wondering if there is anything you might be able to do to help this patient get an abdominal US ordered by Dr Jah Trejo on 6/7  The patient has an unspecified abdominal cystic mass, that we are trying to get further imaging on, however, when she called to schedule and discuss with her insurance they sent her to [address] [place] [address]  There they told her they cannot ultrasound her that late in pregnancy so it seems like maybe there was confusion on what we were asking for which again was specifically to try to evaluate this abdominal cyst     I didn't want her to just call again and get to the same situation so I was wondering if we could help call on her behalf and find out the issue?      Thanks for your help or any ideas you might have    [place]

## 2023-06-30 ENCOUNTER — ROUTINE PRENATAL (OUTPATIENT)
Dept: OBGYN CLINIC | Facility: CLINIC | Age: 33
End: 2023-06-30

## 2023-06-30 VITALS
HEIGHT: 60 IN | DIASTOLIC BLOOD PRESSURE: 71 MMHG | WEIGHT: 217 LBS | BODY MASS INDEX: 42.6 KG/M2 | RESPIRATION RATE: 18 BRPM | HEART RATE: 100 BPM | SYSTOLIC BLOOD PRESSURE: 113 MMHG

## 2023-06-30 DIAGNOSIS — Z98.891 HISTORY OF CESAREAN SECTION: ICD-10-CM

## 2023-06-30 DIAGNOSIS — Z3A.23 23 WEEKS GESTATION OF PREGNANCY: Primary | ICD-10-CM

## 2023-06-30 PROCEDURE — PNV: Performed by: OBSTETRICS & GYNECOLOGY

## 2023-06-30 NOTE — PROGRESS NOTES
Glenn Kennedy presents today for routine OB visit at 23w5d  Blood Pressure: 113/71  Wt=98 4 kg (217 lb); Body mass index is 42 38 kg/m² ; TWG=Not found    ; Fundal Height (cm): 26 cm  Abdomen: gravid, soft, non-tender  She reports no issues or concerns at this time  Denies uterine contractions or persistent cramping  Denies vaginal bleeding or leaking of fluid  Reports fetal movement  Scheduled for ultrasound 23  Reviewed common discomforts of pregnancy in second trimester and warning signs  Advised to continue medications and return in 4 weeks        Current Outpatient Medications   Medication Instructions   • FOLIC ACID PO Oral   • MAGNESIUM PO Oral         ANGELA - 10/22/2023 Problems (from 23 to present)     Problem Noted Resolved    History of  section 2023 by Justus Mcintyre MD No    Overview Signed 2023 12:07 PM by Justus Mcintyre MD     Reports history of  section due to breech presentation s/p failed ECV  Reports her daughter weighed nearly 10lbs  Would consider TOLAC if baby was not measuring big, otherwise open to RLTCS         23 weeks gestation of pregnancy 2023 by Justus Mcintyre MD No    Overview Addendum 2023 11:44 AM by KIM Parks     Prior CS, considering TOLAC depending on size of baby  Contraception: considering vasectomy  Ultrasound on 2023, thickened nuchal fold but low risk NIPT  Declines MSAFP  To complete abdominal ultrasound for 6-7 cm abdominal mass  1 hour  , needs to complete 3-hour GTT  Breast feeding

## 2023-07-12 ENCOUNTER — HOSPITAL ENCOUNTER (OUTPATIENT)
Dept: RADIOLOGY | Facility: HOSPITAL | Age: 33
Discharge: HOME | End: 2023-07-12
Payer: COMMERCIAL

## 2023-07-12 DIAGNOSIS — Z3A.13 13 WEEKS GESTATION OF PREGNANCY: ICD-10-CM

## 2023-07-12 DIAGNOSIS — R19.03 ABDOMINAL MASS, RLQ (RIGHT LOWER QUADRANT): ICD-10-CM

## 2023-07-12 PROCEDURE — 76857 US EXAM PELVIC LIMITED: CPT

## 2023-07-27 ENCOUNTER — ROUTINE PRENATAL (OUTPATIENT)
Dept: OBGYN CLINIC | Facility: CLINIC | Age: 33
End: 2023-07-27

## 2023-07-27 VITALS
SYSTOLIC BLOOD PRESSURE: 106 MMHG | HEART RATE: 100 BPM | WEIGHT: 218 LBS | RESPIRATION RATE: 18 BRPM | HEIGHT: 60 IN | BODY MASS INDEX: 42.8 KG/M2 | DIASTOLIC BLOOD PRESSURE: 67 MMHG

## 2023-07-27 DIAGNOSIS — O09.299 H/O MACROSOMIA IN INFANT IN PRIOR PREGNANCY, CURRENTLY PREGNANT: ICD-10-CM

## 2023-07-27 DIAGNOSIS — O99.210 MATERNAL MORBID OBESITY, ANTEPARTUM (HCC): ICD-10-CM

## 2023-07-27 DIAGNOSIS — E66.01 MATERNAL MORBID OBESITY, ANTEPARTUM (HCC): ICD-10-CM

## 2023-07-27 DIAGNOSIS — O28.3 NUCHAL FOLD THICKENING ON PRENATAL ULTRASOUND: ICD-10-CM

## 2023-07-27 DIAGNOSIS — Z3A.23 23 WEEKS GESTATION OF PREGNANCY: ICD-10-CM

## 2023-07-27 DIAGNOSIS — R73.09 ELEVATED GLUCOSE TOLERANCE TEST: ICD-10-CM

## 2023-07-27 DIAGNOSIS — Z3A.27 27 WEEKS GESTATION OF PREGNANCY: ICD-10-CM

## 2023-07-27 DIAGNOSIS — Z23 NEED FOR VACCINATION: ICD-10-CM

## 2023-07-27 DIAGNOSIS — Z98.891 HISTORY OF CESAREAN SECTION: Primary | ICD-10-CM

## 2023-07-27 DIAGNOSIS — R19.03 ABDOMINAL MASS, RLQ (RIGHT LOWER QUADRANT): ICD-10-CM

## 2023-07-27 PROCEDURE — 90471 IMMUNIZATION ADMIN: CPT | Performed by: OBSTETRICS & GYNECOLOGY

## 2023-07-27 PROCEDURE — PNV: Performed by: OBSTETRICS & GYNECOLOGY

## 2023-07-27 PROCEDURE — 90715 TDAP VACCINE 7 YRS/> IM: CPT | Performed by: OBSTETRICS & GYNECOLOGY

## 2023-07-27 NOTE — PROGRESS NOTES
28 week education packet provided to patient on 07/27/23. Included in packet:   Third Trimester paperwork  Delivery consent   Birthing room support person rules and acknowledgment  Birth Plan   Welcome information  Birth certificate worksheet   Consent for Photographers  Perineal/ Vaginal massage   Pediatric practices and locations     Breast pump order initiated

## 2023-07-27 NOTE — PROGRESS NOTES
[facility]  Name: Pat Burton  MRN: [id number]  : 1990      ASSESSMENT/PLAN:  Problem List        Other    Elevated glucose tolerance test    Overview     Elevated 1 hour  3 hour GTT and A1C ordered - pt reminded to complete         History of  section    Overview     Reports history of  section due to breech presentation s/p failed ECV  Reports her daughter weighed nearly 10lbs  Would consider TOLAC if baby was not measuring big, otherwise open to RLTCS         27 weeks gestation of pregnancy    Overview     Prior CS, considering TOLAC depending on size of baby - now leaning towards RLTCS, continue to discuss  Contraception: partner getting vasectomy  Ultrasound on 2023, thickened nuchal fold but low risk NIPT  Declines MSAFP  1 hour  , needs to complete 3-hour GTT, reminded today  28 week labs ordered  Delivery consent signed 23  Breast feeding         Maternal morbid obesity, antepartum ([address])    H/O macrosomia in infant in prior pregnancy, currently pregnant    Nuchal fold thickening on prenatal ultrasound    Overview     NIPS low risk         Abdominal mass, RLQ (right lower quadrant)    Overview     6-7 cm simple cystic mass seen adjacent to right adnexa at time of NT  Confirmed on repeat US, originating from ovary or fallopian tube  Continue to monitor              SUBJECTIVE 34 y.o. [id number] 27w4d here for PN visit. She denies contractions. She denies leakage of fluid and vaginal bleeding. She reports good fetal movement.      OBJECTIVE:  Vitals:    23 1342   BP: 106/67   Pulse: 100   Resp: 18       Physical Exam    Fundal height: 30 cm  FHT: 141 bpm       Future Appointments   Date Time Provider [address] [place] [address]   2023  1:00 PM  [facility] [address] Magaly Dillon MD  OB/GYN PGY-4  2023  2:01 PM

## 2023-07-30 LAB
DME PARACHUTE DELIVERY DATE REQUESTED: NORMAL
DME PARACHUTE ITEM DESCRIPTION: NORMAL
DME PARACHUTE ORDER STATUS: NORMAL
DME PARACHUTE SUPPLIER NAME: NORMAL
DME PARACHUTE SUPPLIER PHONE: NORMAL

## 2023-08-01 ENCOUNTER — ULTRASOUND (OUTPATIENT)
Facility: HOSPITAL | Age: 33
End: 2023-08-01
Payer: COMMERCIAL

## 2023-08-01 VITALS
BODY MASS INDEX: 42.72 KG/M2 | HEIGHT: 60 IN | HEART RATE: 123 BPM | DIASTOLIC BLOOD PRESSURE: 58 MMHG | SYSTOLIC BLOOD PRESSURE: 104 MMHG | WEIGHT: 217.6 LBS

## 2023-08-01 DIAGNOSIS — E66.01 MATERNAL MORBID OBESITY IN THIRD TRIMESTER, ANTEPARTUM (HCC): Primary | ICD-10-CM

## 2023-08-01 DIAGNOSIS — Z36.89 ENCOUNTER FOR FETAL ANATOMIC SURVEY: ICD-10-CM

## 2023-08-01 DIAGNOSIS — O99.213 MATERNAL MORBID OBESITY IN THIRD TRIMESTER, ANTEPARTUM (HCC): Primary | ICD-10-CM

## 2023-08-01 DIAGNOSIS — Z3A.28 28 WEEKS GESTATION OF PREGNANCY: ICD-10-CM

## 2023-08-01 PROCEDURE — 99214 OFFICE O/P EST MOD 30 MIN: CPT | Performed by: OBSTETRICS & GYNECOLOGY

## 2023-08-01 PROCEDURE — 76816 OB US FOLLOW-UP PER FETUS: CPT | Performed by: OBSTETRICS & GYNECOLOGY

## 2023-08-01 NOTE — LETTER
August 1, 2023     [address] [facility] PROVIDER    Patient: Jono Hawkins   YOB: 1990   Date of Visit: 8/1/2023       Dear  Provider: Thank you for referring Jono Hawkins to me for evaluation. Below are my notes for this consultation. If you have questions, please do not hesitate to call me. I look forward to following your patient along with you.          Sincerely,        Sage Lerma MD        CC: No Recipients    Sage Lerma MD  7/30/2023  1:15 PM  Sign when Signing Visit  Please refer to the [facility] ultrasound report in Ob Procedures for additional information regarding today's visit

## 2023-08-01 NOTE — PATIENT INSTRUCTIONS
Kick Counts in Pregnancy   WHAT YOU NEED TO KNOW:   Kick counts measure how much your baby is moving in your womb. A kick from your baby can be felt as a twist, turn, swish, roll, or jab. It is common to feel your baby kicking at 26 to 28 weeks of pregnancy. You may feel your baby kick as early as 20 weeks of pregnancy. You may want to start counting at 28 weeks. DISCHARGE INSTRUCTIONS:   Contact your doctor immediately if:   You feel a change in the number of kicks or movements of your baby. You feel fewer than 10 kicks within 2 hours. You have questions or concerns about your baby's movements. Why measure kick counts:  Your baby's movement may provide information about your baby's health. He or she may move less, or not at all, if there are problems. Your baby may move less if he or she is not getting enough oxygen or nutrition from the placenta. Do not smoke while you are pregnant. Smoking decreases the amount of oxygen that gets to your baby. Talk to your healthcare provider if you need help to quit smoking. Tell your healthcare provider as soon as you feel a change in your baby's movements. When to measure kick counts:   Measure kick counts at the same time every day. Measure kick counts when your baby is awake and most active. Your baby may be most active in the evening. How to measure kick counts:  Check that your baby is awake before you measure kick counts. You can wake up your baby by lightly pushing on your belly, walking, or drinking something cold. Your healthcare provider may tell you different ways to measure kick counts. You may be told to do the following:  Use a chart or clock to keep track of the time you start and finish counting. Sit in a chair or lie on your left side. Place your hands on the largest part of your belly. Count until you reach 10 kicks. Write down how much time it takes to count 10 kicks. It may take 30 minutes to 2 hours to count 10 kicks. It should not take more than 2 hours to count 10 kicks. Follow up with your doctor as directed:  Write down your questions so you remember to ask them during your visits. © Copyright Miguel Áng[employer]ellin 2022 Information is for End User's use only and may not be sold, redistributed or otherwise used for commercial purposes. The above information is an  only. It is not intended as medical advice for individual conditions or treatments. Talk to your doctor, nurse or pharmacist before following any medical regimen to see if it is safe and effective for you.

## 2023-08-16 PROBLEM — Z3A.30 30 WEEKS GESTATION OF PREGNANCY: Status: ACTIVE | Noted: 2023-04-06

## 2023-08-16 NOTE — PROGRESS NOTES
OB/GYN prenatal visit    S: 34 y.o. [id number] 30w3d here for PN visit. She has no obstetric complaints, including pelvic pain, contractions, vaginal bleeding, loss of fluid, or decreased fetal movement. O:  Vitals:    23 1318   BP: 92/52   Pulse: 101   Resp: 18       Blood Pressure: 92/52  Yi=762 kg (221 lb); Body mass index is 43.16 kg/m².; TWG=Not found. Gen: no acute distress, nonlabored breathing  Abdomen: gravid, nontender  Fundal height: 32 cm  FHT: 135 bpm    A/P:    Problem List Items Addressed This Visit        Other    Elevated glucose tolerance test     Advised patient needs to complete 3h gtt         History of  section    30 weeks gestation of pregnancy     No obstetric complaints today  Needs to complete 28 week labs - reviewed with patient  [address]  14  Birth plan: likely RLTCS. Briefly counseled risks of TOLAC vs RLTCS. Patient feels like this baby is as big as her prior (~10lbs) and unlikely wants to [facility].   Contraception: partner vasectomy  Discussed  labor precautions and fetal kick counts    Return to clinic in 2 weeks          Other Visit Diagnoses     Prenatal care in third trimester    -  Primary            Jus Dinh MD  2023  1:37 PM

## 2023-08-16 NOTE — ASSESSMENT & PLAN NOTE
No obstetric complaints today  Needs to complete 28 week labs - reviewed with patient  [facility] 14  Birth plan: likely RLTCS. Briefly counseled risks of TOLAC vs RLTCS. Patient feels like this baby is as big as her prior (~10lbs) and unlikely wants to [facility].   Contraception: partner vasectomy  Discussed  labor precautions and fetal kick counts    Return to clinic in 2 weeks

## 2023-08-17 ENCOUNTER — ROUTINE PRENATAL (OUTPATIENT)
Dept: OBGYN CLINIC | Facility: CLINIC | Age: 33
End: 2023-08-17

## 2023-08-17 VITALS
HEIGHT: 60 IN | DIASTOLIC BLOOD PRESSURE: 52 MMHG | HEART RATE: 101 BPM | SYSTOLIC BLOOD PRESSURE: 92 MMHG | BODY MASS INDEX: 43.39 KG/M2 | WEIGHT: 221 LBS | RESPIRATION RATE: 18 BRPM

## 2023-08-17 DIAGNOSIS — R73.09 ELEVATED GLUCOSE TOLERANCE TEST: ICD-10-CM

## 2023-08-17 DIAGNOSIS — Z3A.30 30 WEEKS GESTATION OF PREGNANCY: ICD-10-CM

## 2023-08-17 DIAGNOSIS — Z34.93 PRENATAL CARE IN THIRD TRIMESTER: Primary | ICD-10-CM

## 2023-08-17 DIAGNOSIS — Z98.891 HISTORY OF CESAREAN SECTION: ICD-10-CM

## 2023-08-17 PROCEDURE — PNV: Performed by: OBSTETRICS & GYNECOLOGY

## 2023-08-29 LAB
DME PARACHUTE DELIVERY DATE REQUESTED: NORMAL
DME PARACHUTE DELIVERY DATE REQUESTED: NORMAL
DME PARACHUTE ITEM DESCRIPTION: NORMAL
DME PARACHUTE ITEM DESCRIPTION: NORMAL
DME PARACHUTE ORDER STATUS: NORMAL
DME PARACHUTE ORDER STATUS: NORMAL
DME PARACHUTE SUPPLIER NAME: NORMAL
DME PARACHUTE SUPPLIER NAME: NORMAL
DME PARACHUTE SUPPLIER PHONE: NORMAL
DME PARACHUTE SUPPLIER PHONE: NORMAL

## 2023-08-31 PROBLEM — Z3A.32 32 WEEKS GESTATION OF PREGNANCY: Status: ACTIVE | Noted: 2023-04-06

## 2023-09-14 ENCOUNTER — TELEPHONE (OUTPATIENT)
Dept: PERINATAL CARE | Facility: CLINIC | Age: 33
End: 2023-09-14

## 2023-09-18 ENCOUNTER — TELEPHONE (OUTPATIENT)
Facility: HOSPITAL | Age: 33
End: 2023-09-18

## 2023-09-18 NOTE — TELEPHONE ENCOUNTER
Called PT to r/s growth that was cancelled via [facility]. Asked PT of her location preference, she said [facility] and told her next availability was end of October, offered to look at other locations for sooner appt. PT agreed, I asked if she could go to [facility] as availability there this week and PT stated she would go to another hospital. Verified PT did not want to schedule w/[facility], and she confirmed, stated she would go to [facility]. Forwarding to CC so they are aware.

## 2023-09-20 ENCOUNTER — TELEPHONE (OUTPATIENT)
Dept: OBGYN CLINIC | Facility: CLINIC | Age: 33
End: 2023-09-20

## 2023-09-20 NOTE — TELEPHONE ENCOUNTER
Called and spoke to patient, and schedule an OB appt with Dr Marvin Cross on 09/21. Also encouraged patient to call the [facility] office to schedule an appointment.

## 2023-09-20 NOTE — TELEPHONE ENCOUNTER
----- Message from Jocelin Martinez MD sent at 2023  9:59 PM EDT -----  Regarding: missed appointments  Hey team,    Patient missed last appointments and has been a no show since mid-august - can we try calling her again to get an appointment scheduled? She has been no-showing  center as well. Thanks!     Sandro Singh

## 2023-09-21 ENCOUNTER — ROUTINE PRENATAL (OUTPATIENT)
Dept: OBGYN CLINIC | Facility: CLINIC | Age: 33
End: 2023-09-21

## 2023-09-21 ENCOUNTER — HOSPITAL ENCOUNTER (OUTPATIENT)
Facility: HOSPITAL | Age: 33
Discharge: HOME | End: 2023-09-21
Attending: OBSTETRICS & GYNECOLOGY | Admitting: OBSTETRICS & GYNECOLOGY
Payer: COMMERCIAL

## 2023-09-21 VITALS
HEIGHT: 60 IN | WEIGHT: 221 LBS | HEART RATE: 124 BPM | DIASTOLIC BLOOD PRESSURE: 68 MMHG | RESPIRATION RATE: 18 BRPM | BODY MASS INDEX: 43.39 KG/M2 | SYSTOLIC BLOOD PRESSURE: 125 MMHG

## 2023-09-21 VITALS
HEIGHT: 60 IN | WEIGHT: 221 LBS | RESPIRATION RATE: 20 BRPM | HEART RATE: 97 BPM | SYSTOLIC BLOOD PRESSURE: 117 MMHG | DIASTOLIC BLOOD PRESSURE: 67 MMHG | BODY MASS INDEX: 43.39 KG/M2 | TEMPERATURE: 98 F

## 2023-09-21 DIAGNOSIS — O28.3 NUCHAL FOLD THICKENING ON PRENATAL ULTRASOUND: ICD-10-CM

## 2023-09-21 DIAGNOSIS — R73.09 ELEVATED GLUCOSE TOLERANCE TEST: ICD-10-CM

## 2023-09-21 DIAGNOSIS — E66.01 MATERNAL MORBID OBESITY, ANTEPARTUM (HCC): ICD-10-CM

## 2023-09-21 DIAGNOSIS — R19.03 ABDOMINAL MASS, RLQ (RIGHT LOWER QUADRANT): ICD-10-CM

## 2023-09-21 DIAGNOSIS — Z98.891 HISTORY OF CESAREAN SECTION: ICD-10-CM

## 2023-09-21 DIAGNOSIS — Z3A.35 35 WEEKS GESTATION OF PREGNANCY: Primary | ICD-10-CM

## 2023-09-21 DIAGNOSIS — O09.299 H/O MACROSOMIA IN INFANT IN PRIOR PREGNANCY, CURRENTLY PREGNANT: ICD-10-CM

## 2023-09-21 DIAGNOSIS — O99.210 MATERNAL MORBID OBESITY, ANTEPARTUM (HCC): ICD-10-CM

## 2023-09-21 PROBLEM — R00.0 TACHYCARDIA: Status: ACTIVE | Noted: 2023-09-21

## 2023-09-21 LAB
ERYTHROCYTE [DISTWIDTH] IN BLOOD BY AUTOMATED COUNT: 15.6 % (ref 11.6–15.1)
HCT VFR BLD AUTO: 34.8 % (ref 34.8–46.1)
HGB BLD-MCNC: 11.1 G/DL (ref 11.5–15.4)
MCH RBC QN AUTO: 26.9 PG (ref 26.8–34.3)
MCHC RBC AUTO-ENTMCNC: 31.9 G/DL (ref 31.4–37.4)
MCV RBC AUTO: 84 FL (ref 82–98)
PLATELET # BLD AUTO: 364 THOUSANDS/UL (ref 149–390)
PMV BLD AUTO: 10.5 FL (ref 8.9–12.7)
RBC # BLD AUTO: 4.13 MILLION/UL (ref 3.81–5.12)
T3 SERPL-MCNC: 2.4 NG/ML
T4 SERPL-MCNC: 13.99 UG/DL (ref 6.09–12.23)
TSH SERPL DL<=0.05 MIU/L-ACNC: 2.01 UIU/ML (ref 0.45–4.5)
WBC # BLD AUTO: 11.84 THOUSAND/UL (ref 4.31–10.16)

## 2023-09-21 PROCEDURE — 87150 DNA/RNA AMPLIFIED PROBE: CPT | Performed by: OBSTETRICS & GYNECOLOGY

## 2023-09-21 PROCEDURE — 84443 ASSAY THYROID STIM HORMONE: CPT

## 2023-09-21 PROCEDURE — PNV: Performed by: OBSTETRICS & GYNECOLOGY

## 2023-09-21 PROCEDURE — 84480 ASSAY TRIIODOTHYRONINE (T3): CPT

## 2023-09-21 PROCEDURE — 84436 ASSAY OF TOTAL THYROXINE: CPT

## 2023-09-21 PROCEDURE — 85027 COMPLETE CBC AUTOMATED: CPT

## 2023-09-21 PROCEDURE — 93005 ELECTROCARDIOGRAM TRACING: CPT

## 2023-09-21 PROCEDURE — 96360 HYDRATION IV INFUSION INIT: CPT

## 2023-09-21 PROCEDURE — 99213 OFFICE O/P EST LOW 20 MIN: CPT

## 2023-09-21 PROCEDURE — NC001 PR NO CHARGE: Performed by: OBSTETRICS & GYNECOLOGY

## 2023-09-21 RX ADMIN — SODIUM CHLORIDE, SODIUM LACTATE, POTASSIUM CHLORIDE, AND CALCIUM CHLORIDE 1000 ML: .6; .31; .03; .02 INJECTION, SOLUTION INTRAVENOUS at 19:24

## 2023-09-21 NOTE — PROGRESS NOTES
[facility]  Name: Jenny Moran  MRN: [id number]  : 1990      ASSESSMENT/PLAN:  Problem List        Other    Elevated glucose tolerance test    Overview     Elevated 1 hour  3 hour GTT and A1C ordered - pt reminded to complete         History of  section    Overview     Reports history of  section due to breech presentation s/p failed ECV  Reports her daughter weighed nearly 10lbs  Plan for RLTCS + BS, scheduled for 10/17 at 1000         35 weeks gestation of pregnancy    Overview     Scheduled for RLTCS and BS on 10/17 at 1000  Contraception: partner getting vasectomy  Ultrasound on 2023, thickened nuchal fold but low risk NIPT  Declines MSAFP  1 hour  , needs to complete 3-hour GTT, reminded today  28 week labs ordered  S/p tdap  Delivery consent signed 23  Breast feeding  GBS collected   VTX on   Contraception: tubal at time of surgery (private insurance, no MA-31)         Maternal morbid obesity, antepartum ([address])    Overview     Needs APFS starting 34 weeks; M office number provided         H/O macrosomia in infant in prior pregnancy, currently pregnant    Nuchal fold thickening on prenatal ultrasound    Overview     NIPS low risk         Abdominal mass, RLQ (right lower quadrant)    Overview     6-7 cm simple cystic mass seen adjacent to right adnexa at time of NT  Confirmed on repeat US, originating from ovary or fallopian tube  Continue to monitor         Tachycardia    Current Assessment & Plan     Maternal tachycardia 120 to 125 bpm manually (taken multiple times)  Pt reports she has not had much water today and has had a long and stressful day  She has not ingested any caffeine today   We discussed presenting to triage for workup, including EKG to assess her heart and blood work to assess for anemia or thyroid anomaly  Pt could also get IV fluids for hydration in triage  Pt agrees with plan, L&D charge RN and L&D team made aware              SUBJECTIVE 34 y.o. [id number] 35w4d here for PN visit. She deneis contractions. She denies leakage of fluid and vaginal bleeding. She reports good fetal movement.      OBJECTIVE:  Vitals:    09/21/23 1420   BP: 125/68   Pulse: (!) 125   Resp:        Physical Exam    Fundal height: 37 cm  FHT: 147 bpm       Future Appointments   Date Time Provider [address]   9/28/2023  1:15 PM Mani Strange MD [address] [facility] [address] Harrison Mcburney, MD  OB/GYN PGY-4  9/21/2023  3:42 PM

## 2023-09-21 NOTE — PROGRESS NOTES
Triage Note - OB  Mayra Charisse Schlatter 34 y.o. female MRN: [id number]  Unit/Bed#: LD TRIAGE 4-01 Encounter: [id number]    OB TRIAGE NOTE  Leora Christensen  [id number]  9/21/2023  8:52 PM  LD TRIAGE 4/LD TRIAGE 4-*    ASSESS:  34 y.o. [id number] 35w4d with tachycardia    PLAN  #1. Tachycardia: likely secondary to dehydration. R/o arrhythmia   · Patient denies any chest pain, SOB, dizziness, light headedness  · On chart review, patient has been persistently tachycardic throughout her pregnancy (since April) with HR ranging from ; typically 100-105.  at time of this assessment. · Has not been PO hydrating well the past few days;  1L IV NS given  · EKG normal sinus tachycardia. Normal axis. No ST segment changes. · CBC - hemoglobin 11.1  · TSH WNL    #2. Discharge instructions  · Patient instructed to call if experiencing worsening contractions, vaginal bleeding, loss of fluid or decreased fetal movement. · Will follow up with OBGYN on 9/28/23. D/w Dr. Bibiana Key  ______________    SUBJECTIVE    ANGELA: Estimated Date of Delivery: 10/22/23    HPI Chronology:  34 y.o. [id number] 35w4d presents with complaint of tachycardia. She was seen for her routine OB appointment today and was found to be persistently tachycardic to 120-125 on multiple checks throughout the appintment. She was normotensive. She denies noticing her tachycardia and was completely asymptomatic. She does not have a personal or family history of heart disease and is unaware of any past diagnosis of tachycardia. Pregnancy is likely to be complicated by GDM as patient failed 1hr GTT; awaiting 3h GTT. Contractions: no  Leakage: no  Bleeding: no  Fetal Movement: unchanged   Pelvic pain: no    Vitals:   /67   Pulse 97   Temp 98 °F (36.7 °C) (Oral)   Resp 20   Ht 5' (1.524 m)   Wt 100 kg (221 lb)   LMP 01/15/2023 (Exact Date)   BMI 43.16 kg/m²   Body mass index is 43.16 kg/m².     Review of Systems   Constitutional: Negative for activity change, appetite change and fever. Respiratory: Negative for chest tightness and shortness of breath. Cardiovascular: Negative for chest pain and palpitations. Gastrointestinal: Negative for abdominal pain. Genitourinary: Negative for vaginal bleeding and vaginal discharge. Musculoskeletal: Positive for back pain (Chronic back pain; unchanged). Skin: Negative for color change and pallor. Neurological: Negative for dizziness, syncope, weakness and light-headedness. Physical Exam  Constitutional:       Appearance: Normal appearance. HENT:      Head: Normocephalic and atraumatic. Cardiovascular:      Rate and Rhythm: Tachycardia present. Pulmonary:      Effort: Pulmonary effort is normal. No respiratory distress. Abdominal:      General: There is no distension. Palpations: Abdomen is soft. Tenderness: There is no abdominal tenderness. There is no guarding. Musculoskeletal:      Right lower leg: No edema. Left lower leg: No edema. Skin:     General: Skin is warm and dry. Neurological:      General: No focal deficit present. Mental Status: She is alert and oriented to person, place, and time.          FHT:  Baseline Rate: 125 bpm  Variability: Moderate 6-25 bpm  Accelerations: 15 x 15 or greater  Decelerations: None  FHR Category: Category I  TOCO:   Contraction Frequency (minutes): 5  Contraction Duration (seconds): 60-90  Contraction Quality: Mild    Labs:   Recent Results (from the past 24 hour(s))   CBC and Platelet    Collection Time: 09/21/23  7:02 PM   Result Value Ref Range    WBC 11.84 (H) 4.31 - 10.16 Thousand/uL    RBC 4.13 3.81 - 5.12 Million/uL    Hemoglobin 11.1 (L) 11.5 - 15.4 g/dL    Hematocrit 34.8 34.8 - 46.1 %    MCV 84 82 - 98 fL    MCH 26.9 26.8 - 34.3 pg    MCHC 31.9 31.4 - 37.4 g/dL    RDW 15.6 (H) 11.6 - 15.1 %    Platelets 155 817 - 550 Thousands/uL    MPV 10.5 8.9 - 12.7 fL   TSH, 3rd generation    Collection Time: 09/21/23  7:02 PM   Result Value Ref Range    TSH 3RD [facility] 2.009 0.450 - 4.500 uIU/mL       Lab, Imaging and other studies: I have personally reviewed pertinent reports.         Thomas Coffey MD  9/21/2023  8:52 PM

## 2023-09-21 NOTE — ASSESSMENT & PLAN NOTE
Maternal tachycardia 120 to 125 bpm manually (taken multiple times)  Pt reports she has not had much water today and has had a long and stressful day  She has not ingested any caffeine today   We discussed presenting to triage for workup, including EKG to assess her heart and blood work to assess for anemia or thyroid anomaly  Pt could also get IV fluids for hydration in triage  Pt agrees with plan, L&D charge RN and L&D team made aware

## 2023-09-24 LAB
ATRIAL RATE: 102 BPM
P AXIS: 31 DEGREES
PR INTERVAL: 168 MS
QRS AXIS: 51 DEGREES
QRSD INTERVAL: 74 MS
QT INTERVAL: 362 MS
QTC INTERVAL: 471 MS
T WAVE AXIS: 31 DEGREES
VENTRICULAR RATE: 102 BPM

## 2023-09-24 PROCEDURE — 93010 ELECTROCARDIOGRAM REPORT: CPT | Performed by: INTERNAL MEDICINE

## 2023-09-25 PROBLEM — Z3A.36 36 WEEKS GESTATION OF PREGNANCY: Status: ACTIVE | Noted: 2023-04-06

## 2023-09-25 LAB — GP B STREP DNA SPEC QL NAA+PROBE: NEGATIVE

## 2023-09-29 ENCOUNTER — TELEPHONE (OUTPATIENT)
Dept: OBGYN CLINIC | Facility: CLINIC | Age: 33
End: 2023-09-29

## 2023-09-29 NOTE — TELEPHONE ENCOUNTER
----- Message from Maximo Glez MD sent at 9/29/2023 10:36 AM EDT -----  Please inform the patient that she is GBS negative. Thank you.

## 2023-10-02 ENCOUNTER — ULTRASOUND (OUTPATIENT)
Dept: PERINATAL CARE | Facility: CLINIC | Age: 33
End: 2023-10-02
Payer: COMMERCIAL

## 2023-10-02 VITALS
SYSTOLIC BLOOD PRESSURE: 108 MMHG | WEIGHT: 225 LBS | DIASTOLIC BLOOD PRESSURE: 58 MMHG | HEIGHT: 60 IN | BODY MASS INDEX: 44.17 KG/M2

## 2023-10-02 DIAGNOSIS — R19.03 ABDOMINAL MASS, RLQ (RIGHT LOWER QUADRANT): ICD-10-CM

## 2023-10-02 DIAGNOSIS — Z3A.37 37 WEEKS GESTATION OF PREGNANCY: Primary | ICD-10-CM

## 2023-10-02 DIAGNOSIS — O28.3 NUCHAL FOLD THICKENING ON PRENATAL ULTRASOUND: ICD-10-CM

## 2023-10-02 DIAGNOSIS — Z98.891 HISTORY OF CESAREAN SECTION: ICD-10-CM

## 2023-10-02 DIAGNOSIS — R73.09 ELEVATED GLUCOSE TOLERANCE TEST: ICD-10-CM

## 2023-10-02 DIAGNOSIS — O09.299 H/O MACROSOMIA IN INFANT IN PRIOR PREGNANCY, CURRENTLY PREGNANT: ICD-10-CM

## 2023-10-02 PROCEDURE — 76816 OB US FOLLOW-UP PER FETUS: CPT | Performed by: OBSTETRICS & GYNECOLOGY

## 2023-10-02 PROCEDURE — 59025 FETAL NON-STRESS TEST: CPT | Performed by: OBSTETRICS & GYNECOLOGY

## 2023-10-02 NOTE — PROGRESS NOTES
A fetal ultrasound and NST were completed. See Ob procedures in Epic for an interpretation and recommendations. Do not hesitate to contact us in [facility] if you have questions. Marsha Acuña MD, [facility]  Maternal Fetal Medicine

## 2023-10-02 NOTE — PROGRESS NOTES
Non-Stress Testing:    Non-Stress test, equipment, procedure, and expected outcomes explained. Reviewed fetal kick counts and when to call OB. Verified patient understanding of fetal kick counts with teach back method. Patient reports feeling daily fetal movements. Patient has no questions or concerns. Dr. Patricia Amaya viewed NST strip prior to completion of visit.

## 2023-10-02 NOTE — LETTER
October 2, 2023     Max Norton MD  [address] [facility] [address]    Patient: Jenny Moran   YOB: 1990   Date of Visit: 10/2/2023       Dear Dr. Mirlande Kim: Thank you for referring Jenny Moran to me for evaluation. Below are my notes for this consultation. If you have questions, please do not hesitate to call me. I look forward to following your patient along with you. Sincerely,        Ander Gomez MD        CC: No Recipients    Ander Gomez MD  10/2/2023  1:26 PM  Sign when Signing Visit  A fetal ultrasound and NST were completed. See Ob procedures in Epic for an interpretation and recommendations. Do not hesitate to contact us in [facility] if you have questions. Sreekanth Woodruff MD, [facility]  Maternal Fetal Medicine

## 2023-10-03 ENCOUNTER — ROUTINE PRENATAL (OUTPATIENT)
Dept: OBGYN CLINIC | Facility: CLINIC | Age: 33
End: 2023-10-03

## 2023-10-03 VITALS
SYSTOLIC BLOOD PRESSURE: 101 MMHG | HEART RATE: 116 BPM | DIASTOLIC BLOOD PRESSURE: 69 MMHG | HEIGHT: 60 IN | WEIGHT: 222 LBS | BODY MASS INDEX: 43.59 KG/M2 | RESPIRATION RATE: 18 BRPM

## 2023-10-03 DIAGNOSIS — Z3A.37 37 WEEKS GESTATION OF PREGNANCY: Primary | ICD-10-CM

## 2023-10-03 NOTE — PROGRESS NOTES
[address]   PRENATAL VISIT  Yamileth Gallagher  [id number]  1990        A/P:    Problem List        Other    Elevated glucose tolerance test    Overview     Elevated 1 hour  3 hour GTT and A1C ordered - pt reminded to complete         History of  section    Overview     Reports history of  section due to breech presentation s/p failed ECV  Reports her daughter weighed nearly 10lbs  Plan for RLTCS + BS, scheduled for 10/17 at 1000         37 weeks gestation of pregnancy    Overview     Scheduled for RLTCS and BS on 10/17 at 1000  Contraception: partner getting vasectomy  Ultrasound on 2023, thickened nuchal fold but low risk NIPT  Declines MSAFP  1 hour  , needs to complete 3-hour GTT, reminded today  28 week labs wnl, not yet completed 3h GTT  S/p tdap  Delivery consent signed 23  Breast feeding  GBS neg  Contraception: tubal at time of surgery (private insurance, no MA-31)         Maternal morbid obesity, antepartum ([address])    Overview     Needs APFS starting 34 weeks; MFM office number provided         H/O macrosomia in infant in prior pregnancy, currently pregnant    Nuchal fold thickening on prenatal ultrasound    Overview     NIPS low risk         Abdominal mass, RLQ (right lower quadrant)    Overview     6-7 cm simple cystic mass seen adjacent to right adnexa at time of NT  Confirmed on repeat US, originating from ovary or fallopian tube  Continue to monitor         Tachycardia          S: 34 y.o. [id number] 37w2d here for PN visit. She has no contractions. She has no leakage of fluid and vaginal bleeding. She has good fetal movement. O:  Vitals:    10/03/23 1127   BP: 101/69   Pulse: (!) 116   Resp: 18     Physical Exam  Constitutional:       Appearance: She is obese. HENT:      Head: Normocephalic. Eyes:      Conjunctiva/sclera: Conjunctivae normal.   Cardiovascular:      Rate and Rhythm: Normal rate. Pulses: Normal pulses.    Pulmonary: Effort: Pulmonary effort is normal.   Abdominal:      Palpations: Abdomen is soft. Tenderness: There is no abdominal tenderness. Neurological:      Mental Status: She is alert and oriented to person, place, and time.    Psychiatric:         Mood and Affect: Mood normal.         Fetal Heart Rate: 140  Fundal Height (cm): 39 cm    D/w Dr. Susanne Pereira MD  PGY-3  10/3/2023  6:08 PM

## 2023-10-10 PROBLEM — Z3A.38 38 WEEKS GESTATION OF PREGNANCY: Status: ACTIVE | Noted: 2023-04-06

## 2023-10-11 ENCOUNTER — ROUTINE PRENATAL (OUTPATIENT)
Dept: OBGYN CLINIC | Facility: CLINIC | Age: 33
End: 2023-10-11

## 2023-10-11 VITALS
RESPIRATION RATE: 18 BRPM | DIASTOLIC BLOOD PRESSURE: 84 MMHG | HEART RATE: 106 BPM | BODY MASS INDEX: 43.98 KG/M2 | SYSTOLIC BLOOD PRESSURE: 122 MMHG | WEIGHT: 224 LBS | HEIGHT: 60 IN

## 2023-10-11 DIAGNOSIS — O09.299 H/O MACROSOMIA IN INFANT IN PRIOR PREGNANCY, CURRENTLY PREGNANT: Primary | ICD-10-CM

## 2023-10-11 DIAGNOSIS — Z23 NEED FOR VACCINATION: ICD-10-CM

## 2023-10-11 DIAGNOSIS — Z3A.38 38 WEEKS GESTATION OF PREGNANCY: ICD-10-CM

## 2023-10-11 PROCEDURE — PNV: Performed by: NURSE PRACTITIONER

## 2023-10-11 PROCEDURE — 90471 IMMUNIZATION ADMIN: CPT | Performed by: NURSE PRACTITIONER

## 2023-10-11 PROCEDURE — 90686 IIV4 VACC NO PRSV 0.5 ML IM: CPT | Performed by: NURSE PRACTITIONER

## 2023-10-11 NOTE — PROGRESS NOTES
[address]  OB/GYN prenatal visit    S: 34 y.o. [id number] 38w2d here for PN visit. She has no obstetric complaints, including pelvic pain, contractions, vaginal bleeding, loss of fluid, or decreased fetal movement. O:  Vitals:    10/11/23 1137   BP: 122/84   Pulse: (!) 106   Resp: 18       Gen: no acute distress, nonlabored breathing  Fundal Height (cm): 38 cm  Fetal Heart Rate: 152    A/P:      IUP at 38w2d  No obstetric complaints today  Has APFS tomorrow  Reminded to complete fasting 3-hour GTT test  Reaffirmed with patient she desires tubal with her repeat , scheduled for 10/17/23 at 1000  She was given chlorhexidine skin scrub instructions  reviewed with pt,   Vaccinations: flu vaccine today  Discussed labor precautions, reviewed to call with any labor symptoms or concerns and continue fetal kick counts      Problem List          Other    Elevated glucose tolerance test    Overview     Elevated 1 hour  3 hour GTT and A1C ordered - pt reminded to complete         History of  section    Overview     Reports history of  section due to breech presentation s/p failed ECV  Reports her daughter weighed nearly 10lbs  Plan for RLTCS + BS, scheduled for 10/17 at 1000         38 weeks gestation of pregnancy    Overview     Scheduled for RLTCS and BS on 10/17 at 1000, has hibiclens skin scrub.    Contraception: partner getting vasectomy  Ultrasound on 2023, thickened nuchal fold but low risk NIPT  Declines MSAFP  1 hour  , needs to complete 3-hour GTT, reminded today  28 week labs wnl, not yet completed 3h GTT  S/p tdap  Delivery consent signed 23  Breast feeding  GBS neg  Contraception: tubal at time of surgery (private insurance, no MA-31)  10/11/23 flu vaccine         Maternal morbid obesity, antepartum ([address])    Overview     Needs APFS starting 34 weeks; M office number provided         H/O macrosomia in infant in prior pregnancy, currently pregnant    Nuchal fold thickening on prenatal ultrasound    Overview     NIPS low risk         Abdominal mass, RLQ (right lower quadrant)    Overview     6-7 cm simple cystic mass seen adjacent to right adnexa at time of NT  Confirmed on repeat US, originating from ovary or fallopian tube  Continue to monitor         Tachycardia    Need for vaccination        Timmy ALVRAEZ  C10/11/2023  12:08 PM

## 2023-10-12 ENCOUNTER — ULTRASOUND (OUTPATIENT)
Dept: PERINATAL CARE | Facility: CLINIC | Age: 33
End: 2023-10-12
Payer: COMMERCIAL

## 2023-10-12 VITALS
DIASTOLIC BLOOD PRESSURE: 70 MMHG | WEIGHT: 226.8 LBS | BODY MASS INDEX: 44.52 KG/M2 | HEART RATE: 101 BPM | SYSTOLIC BLOOD PRESSURE: 118 MMHG | HEIGHT: 60 IN

## 2023-10-12 DIAGNOSIS — E66.01 MATERNAL MORBID OBESITY, ANTEPARTUM (HCC): Primary | ICD-10-CM

## 2023-10-12 DIAGNOSIS — Z3A.38 38 WEEKS GESTATION OF PREGNANCY: ICD-10-CM

## 2023-10-12 DIAGNOSIS — O99.210 MATERNAL MORBID OBESITY, ANTEPARTUM (HCC): Primary | ICD-10-CM

## 2023-10-12 PROCEDURE — 59025 FETAL NON-STRESS TEST: CPT | Performed by: OBSTETRICS & GYNECOLOGY

## 2023-10-12 PROCEDURE — 76815 OB US LIMITED FETUS(S): CPT | Performed by: OBSTETRICS & GYNECOLOGY

## 2023-10-12 NOTE — PROGRESS NOTES
Repeat Non-Stress Testing:    Patient verbalizes +FM. Pt denies ALL:               Leaking of fluid   Contractions   Vaginal bleeding   Decreased fetal movement    Patient is performing daily kick counts. Patient has no questions or concerns. NST strip reviewed by Dr. Nora Paul.

## 2023-10-15 ENCOUNTER — HOSPITAL ENCOUNTER (OUTPATIENT)
Facility: HOSPITAL | Age: 33
Discharge: HOME | End: 2023-10-15
Attending: OBSTETRICS & GYNECOLOGY | Admitting: OBSTETRICS & GYNECOLOGY
Payer: COMMERCIAL

## 2023-10-15 VITALS
DIASTOLIC BLOOD PRESSURE: 74 MMHG | TEMPERATURE: 98.4 F | HEART RATE: 102 BPM | SYSTOLIC BLOOD PRESSURE: 119 MMHG | RESPIRATION RATE: 18 BRPM

## 2023-10-15 DIAGNOSIS — R19.03 ABDOMINAL MASS, RLQ (RIGHT LOWER QUADRANT): ICD-10-CM

## 2023-10-15 DIAGNOSIS — Z98.891 HISTORY OF CESAREAN SECTION: ICD-10-CM

## 2023-10-15 DIAGNOSIS — B96.89 BACTERIAL VAGINOSIS IN PREGNANCY: Primary | ICD-10-CM

## 2023-10-15 DIAGNOSIS — O23.599 BACTERIAL VAGINOSIS IN PREGNANCY: Primary | ICD-10-CM

## 2023-10-15 PROCEDURE — 99214 OFFICE O/P EST MOD 30 MIN: CPT

## 2023-10-15 PROCEDURE — 96360 HYDRATION IV INFUSION INIT: CPT

## 2023-10-15 PROCEDURE — NC001 PR NO CHARGE: Performed by: OBSTETRICS & GYNECOLOGY

## 2023-10-15 PROCEDURE — 76815 OB US LIMITED FETUS(S): CPT | Performed by: OBSTETRICS & GYNECOLOGY

## 2023-10-15 PROCEDURE — 76815 OB US LIMITED FETUS(S): CPT

## 2023-10-15 RX ORDER — METRONIDAZOLE 500 MG/1
500 TABLET ORAL EVERY 12 HOURS SCHEDULED
Qty: 14 TABLET | Refills: 0 | Status: SHIPPED | OUTPATIENT
Start: 2023-10-15 | End: 2023-10-22

## 2023-10-15 RX ADMIN — SODIUM CHLORIDE, SODIUM LACTATE, POTASSIUM CHLORIDE, AND CALCIUM CHLORIDE 1000 ML: .6; .31; .03; .02 INJECTION, SOLUTION INTRAVENOUS at 15:44

## 2023-10-15 NOTE — PROCEDURES
No Wood, a C3T1880 at 39w0d with an ANGELA of 10/22/2023, by Last Menstrual Period, was seen at [address] [place] [address] for the following procedure(s): $Procedure Type: CINTHIA]         4 Quadrant CINTHIA  CINTHIA Q1 (cm): 5.9 cm  CINTHIA Q2 (cm): 2.9 cm  CINTHIA Q3 (cm): 3.3 cm  CINTHIA Q4 (cm): 4.6 cm  CINTHIA TOTAL (cm): 16.7 cm

## 2023-10-15 NOTE — PROGRESS NOTES
L&D Triage Note - OB/GYN  Jenny Moran 34 y.o. female MRN: [id number]  Unit/Bed#:  TRIAGE 1-01 Encounter: [id number]      ASSESSMENT:    Jenny Moran is a 34 y.o. [id number] at 39w0d presenting to triage with complaint of abdominal and back pain, with spotting and leakage of fluid. R/o labor, r/o ROM  negative. Appropriate for discharge    PLAN:    1) R/o labor  -SVE FT/30/-3, repeat unchanged  -NST reactive  -IV fluids given; irritability on toco  2)R/o ROM  -Nitrazine neg, ferning neg  -CINTHIA 16cm  3) Bacterial vaginosis  -Flagyl 500mg BID   4) Continue routine prenatal care  5) Discharge from [facility] triage with term labor precautions    - Reviewed rupture of membranes, false vs true labor, decreased fetal movement, and vaginal bleeding   - Pt to call provider with any concerns and follow up at her next scheduled prenatal appointment    - Case discussed with Dr. Baca Pal:    Jenny Moran 34 y.o. [id number] at 39w0d with an Estimated Date of Delivery: 10/22/23 presenting to triage with complaint of intermittent abdominal pain and back pain. The pain is not specific to any and just happens spontaneously. She also states that this morning she had some light spotting and loss of fluid.   Patient feels normal fetal movement    Her current obstetrical history is significant for elevated BMI    Her past obstetrical history is significant for prior CS      OBJECTIVE:    Vitals:    10/15/23 1457   BP: 119/74   Pulse: 102   Resp: 18   Temp:        ROS:  Constitutional: Negative  Respiratory: Negative  Cardiovascular: Negative    Gastrointestinal: Negative    General Physical Exam:  General: in no apparent distress  Cardiovascular: No murmurs  Lungs: non-labored breathing  Abdomen: abdomen is soft without significant tenderness, masses, organomegaly or guarding  Lower extremeties: nontender    Cervical Exam  Speculum: Cervical os is closed, presence of cervical mucus with small clots, no active bleeding    SVE:   Cervical Dilation: Fingertip  Cervical Effacement: 30  Cervical Consistency: Soft  Fetal Station: -2  Presentation: Vertex  Method: Manual  OB Examiner: Noemí    FHT:  Baseline Rate: 140 bpm  Variability: Moderate 6-25 bpm  Accelerations: 15 x 15 or greater, At variable times  Decelerations: None  FHR Category: Category I    TOCO:   Contraction Frequency (minutes): irregular  Contraction Duration (seconds): 60-90  Contraction Quality: Mild    Lab Results   Component Value Date    WBC 11.84 (H) 09/21/2023    HGB 11.1 (L) 09/21/2023    HCT 34.8 09/21/2023     09/21/2023     No results found for: "NA", "K", "CL", "CO2", "BUN", "CREATININE", "GLUCOSE", "AST", "ALT"    KOH/WTMT:     Infection:   - >50% clue cells    - no hyphae   - no trichomonads present    Membrane status   - neg ferning   - neg nitrazene   - no pooling   Imaging:            Abd. US        4 Quadrant CINTHIA  CINTHIA Q1 (cm): 5.9 cm  CITNHIA Q2 (cm): 2.9 cm  CINTHIA Q3 (cm): 3.3 cm  CINTHIA Q4 (cm): 4.6 cm  CINTHIA TOTAL (cm): 16.7 cm       Gabriela Saab MD,  OBGYN PGY-2  10/15/2023 5:18 PM

## 2023-10-16 RX ORDER — SODIUM CHLORIDE, SODIUM LACTATE, POTASSIUM CHLORIDE, CALCIUM CHLORIDE 600; 310; 30; 20 MG/100ML; MG/100ML; MG/100ML; MG/100ML
125 INJECTION, SOLUTION INTRAVENOUS CONTINUOUS
Status: DISCONTINUED | OUTPATIENT
Start: 2023-10-16 | End: 2023-10-16 | Stop reason: SDUPTHER

## 2023-10-16 RX ORDER — CEFAZOLIN SODIUM 2 G/50ML
2000 SOLUTION INTRAVENOUS ONCE
Status: DISCONTINUED | OUTPATIENT
Start: 2023-10-16 | End: 2023-10-16 | Stop reason: SDUPTHER
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
## 2023-10-16 NOTE — PRE-PROCEDURE INSTRUCTIONS
Phone call to patient for pre-operative instructions prior to     Pt was instructed to arrive at 0800 2 hours before her scheduled OR time of 1000     Pt instructed to remain NPO after midnight. *This includes gum, water and hard candy. *If patient takes AM meds (e.g.hypertensive meds) they may take these meds with a sip of water. Pt should brush their teeth as usual.    Pt was instructed to buy and use a pre-surgical wash containing chlorhexidine the night before and the morning of her scheduled  and to wear clean clothing after the wash. Pt instructed not to shave operative area prior to surgery. Pt was asked not to wear any jewelry and to leave all of her valuables at home. Pt was asked to leave all of her larger bags and suitcases in the car to be brought in after she is assigned a post partum room. Pt should bring in any paperwork she was given in the office. Pt was informed that she may have 1 support person in the OR/PACU area and of the current visiting policies. Support person should eat prior to the surgery.

## 2023-10-17 ENCOUNTER — HOSPITAL ENCOUNTER (INPATIENT)
Facility: HOSPITAL | Age: 33
LOS: 3 days | Discharge: HOME | End: 2023-10-20
Attending: OBSTETRICS & GYNECOLOGY | Admitting: OBSTETRICS & GYNECOLOGY
Payer: COMMERCIAL

## 2023-10-17 ENCOUNTER — ANESTHESIA EVENT (INPATIENT)
Dept: LABOR AND DELIVERY | Facility: HOSPITAL | Age: 33
End: 2023-10-17
Payer: COMMERCIAL

## 2023-10-17 ENCOUNTER — ANESTHESIA (INPATIENT)
Dept: LABOR AND DELIVERY | Facility: HOSPITAL | Age: 33
End: 2023-10-17
Payer: COMMERCIAL

## 2023-10-17 DIAGNOSIS — Z98.891 HISTORY OF CESAREAN SECTION: ICD-10-CM

## 2023-10-17 DIAGNOSIS — Z98.891 STATUS POST REPEAT LOW TRANSVERSE CESAREAN SECTION: Primary | ICD-10-CM

## 2023-10-17 DIAGNOSIS — Z30.2 REQUEST FOR STERILIZATION: ICD-10-CM

## 2023-10-17 DIAGNOSIS — O34.219 PREVIOUS CESAREAN SECTION COMPLICATING PREGNANCY: ICD-10-CM

## 2023-10-17 DIAGNOSIS — Z3A.39 39 WEEKS GESTATION OF PREGNANCY: ICD-10-CM

## 2023-10-17 PROBLEM — N83.209 OVARIAN CYST: Status: ACTIVE | Noted: 2023-04-19

## 2023-10-17 LAB
ABO GROUP BLD: NORMAL
BASE EXCESS BLDCOA CALC-SCNC: -3 MMOL/L (ref 3–11)
BASE EXCESS BLDCOV CALC-SCNC: -4 MMOL/L (ref 1–9)
BASOPHILS # BLD AUTO: 0.02 THOUSANDS/ÂΜL (ref 0–0.1)
BASOPHILS NFR BLD AUTO: 0 % (ref 0–1)
BLD GP AB SCN SERPL QL: NEGATIVE
EOSINOPHIL # BLD AUTO: 0.07 THOUSAND/ÂΜL (ref 0–0.61)
EOSINOPHIL NFR BLD AUTO: 1 % (ref 0–6)
ERYTHROCYTE [DISTWIDTH] IN BLOOD BY AUTOMATED COUNT: 15.9 % (ref 11.6–15.1)
GLUCOSE SERPL-MCNC: 82 MG/DL (ref 65–140)
GLUCOSE SERPL-MCNC: 94 MG/DL (ref 65–140)
HCO3 BLDCOA-SCNC: 25.8 MMOL/L (ref 17.3–27.3)
HCO3 BLDCOV-SCNC: 22 MMOL/L (ref 12.2–28.6)
HCT VFR BLD AUTO: 35.2 % (ref 34.8–46.1)
HGB BLD-MCNC: 11.1 G/DL (ref 11.5–15.4)
HOLD SPECIMEN: NORMAL
IMM GRANULOCYTES # BLD AUTO: 0.08 THOUSAND/UL (ref 0–0.2)
IMM GRANULOCYTES NFR BLD AUTO: 1 % (ref 0–2)
LYMPHOCYTES # BLD AUTO: 3.48 THOUSANDS/ÂΜL (ref 0.6–4.47)
LYMPHOCYTES NFR BLD AUTO: 29 % (ref 14–44)
MCH RBC QN AUTO: 25.7 PG (ref 26.8–34.3)
MCHC RBC AUTO-ENTMCNC: 31.5 G/DL (ref 31.4–37.4)
MCV RBC AUTO: 82 FL (ref 82–98)
MONOCYTES # BLD AUTO: 0.58 THOUSAND/ÂΜL (ref 0.17–1.22)
MONOCYTES NFR BLD AUTO: 5 % (ref 4–12)
NEUTROPHILS # BLD AUTO: 7.64 THOUSANDS/ÂΜL (ref 1.85–7.62)
NEUTS SEG NFR BLD AUTO: 64 % (ref 43–75)
NRBC BLD AUTO-RTO: 0 /100 WBCS
O2 CT VFR BLDCOA CALC: 4.1 ML/DL
OXYHGB MFR BLDCOA: 17.9 %
OXYHGB MFR BLDCOV: 49.9 %
PCO2 BLDCOA: 61.2 MM[HG] (ref 30–60)
PCO2 BLDCOV: 43.4 MM HG (ref 27–43)
PH BLDCOA: 7.24 [PH] (ref 7.23–7.43)
PH BLDCOV: 7.32 [PH] (ref 7.19–7.49)
PLATELET # BLD AUTO: 294 THOUSANDS/UL (ref 149–390)
PMV BLD AUTO: 11.1 FL (ref 8.9–12.7)
PO2 BLDCOA: 12.5 MM HG (ref 5–25)
PO2 BLDCOV: 23.7 MM HG (ref 15–45)
RBC # BLD AUTO: 4.32 MILLION/UL (ref 3.81–5.12)
RH BLD: POSITIVE
SAO2 % BLDCOV: 11.2 ML/DL
SPECIMEN EXPIRATION DATE: NORMAL
TREPONEMA PALLIDUM IGG+IGM AB [PRESENCE] IN SERUM OR PLASMA BY IMMUNOASSAY: NORMAL
WBC # BLD AUTO: 11.87 THOUSAND/UL (ref 4.31–10.16)

## 2023-10-17 PROCEDURE — 85025 COMPLETE CBC W/AUTO DIFF WBC: CPT | Performed by: OBSTETRICS & GYNECOLOGY

## 2023-10-17 PROCEDURE — 86900 BLOOD TYPING SEROLOGIC ABO: CPT | Performed by: OBSTETRICS & GYNECOLOGY

## 2023-10-17 PROCEDURE — 82805 BLOOD GASES W/O2 SATURATION: CPT | Performed by: OBSTETRICS & GYNECOLOGY

## 2023-10-17 PROCEDURE — NC001 PR NO CHARGE: Performed by: OBSTETRICS & GYNECOLOGY

## 2023-10-17 PROCEDURE — 86850 RBC ANTIBODY SCREEN: CPT | Performed by: OBSTETRICS & GYNECOLOGY

## 2023-10-17 PROCEDURE — 86780 TREPONEMA PALLIDUM: CPT | Performed by: OBSTETRICS & GYNECOLOGY

## 2023-10-17 PROCEDURE — 94762 N-INVAS EAR/PLS OXIMTRY CONT: CPT

## 2023-10-17 PROCEDURE — 82948 REAGENT STRIP/BLOOD GLUCOSE: CPT

## 2023-10-17 PROCEDURE — 86901 BLOOD TYPING SEROLOGIC RH(D): CPT | Performed by: OBSTETRICS & GYNECOLOGY

## 2023-10-17 PROCEDURE — 59510 CESAREAN DELIVERY: CPT | Performed by: OBSTETRICS & GYNECOLOGY

## 2023-10-17 RX ORDER — OXYCODONE HYDROCHLORIDE 10 MG/1
10 TABLET ORAL EVERY 4 HOURS PRN
Status: DISCONTINUED | OUTPATIENT
Start: 2023-10-17 | End: 2023-10-20 | Stop reason: HOSPADM

## 2023-10-17 RX ORDER — OXYTOCIN/RINGER'S LACTATE 30/500 ML
62.5 PLASTIC BAG, INJECTION (ML) INTRAVENOUS ONCE
Status: COMPLETED | OUTPATIENT
Start: 2023-10-17 | End: 2023-10-17

## 2023-10-17 RX ORDER — NALBUPHINE HYDROCHLORIDE 10 MG/ML
5 INJECTION, SOLUTION INTRAMUSCULAR; INTRAVENOUS; SUBCUTANEOUS
Status: DISPENSED | OUTPATIENT
Start: 2023-10-17 | End: 2023-10-18

## 2023-10-17 RX ORDER — OXYTOCIN/RINGER'S LACTATE 30/500 ML
PLASTIC BAG, INJECTION (ML) INTRAVENOUS AS NEEDED
Status: DISCONTINUED | OUTPATIENT
Start: 2023-10-17 | End: 2023-10-17

## 2023-10-17 RX ORDER — ACETAMINOPHEN 325 MG/1
650 TABLET ORAL EVERY 6 HOURS SCHEDULED
Status: DISCONTINUED | OUTPATIENT
Start: 2023-10-17 | End: 2023-10-18

## 2023-10-17 RX ORDER — SIMETHICONE 80 MG
80 TABLET,CHEWABLE ORAL 4 TIMES DAILY PRN
Status: DISCONTINUED | OUTPATIENT
Start: 2023-10-17 | End: 2023-10-20 | Stop reason: HOSPADM

## 2023-10-17 RX ORDER — ONDANSETRON 2 MG/ML
4 INJECTION INTRAMUSCULAR; INTRAVENOUS ONCE AS NEEDED
Status: DISCONTINUED | OUTPATIENT
Start: 2023-10-17 | End: 2023-10-20 | Stop reason: HOSPADM

## 2023-10-17 RX ORDER — SODIUM CHLORIDE, SODIUM LACTATE, POTASSIUM CHLORIDE, CALCIUM CHLORIDE 600; 310; 30; 20 MG/100ML; MG/100ML; MG/100ML; MG/100ML
125 INJECTION, SOLUTION INTRAVENOUS CONTINUOUS
Status: DISCONTINUED | OUTPATIENT
Start: 2023-10-17 | End: 2023-10-17

## 2023-10-17 RX ORDER — ACETAMINOPHEN 325 MG/1
650 TABLET ORAL EVERY 6 HOURS SCHEDULED
Status: DISCONTINUED | OUTPATIENT
Start: 2023-10-18 | End: 2023-10-20 | Stop reason: HOSPADM

## 2023-10-17 RX ORDER — METOCLOPRAMIDE HYDROCHLORIDE 5 MG/ML
INJECTION INTRAMUSCULAR; INTRAVENOUS AS NEEDED
Status: DISCONTINUED | OUTPATIENT
Start: 2023-10-17 | End: 2023-10-17

## 2023-10-17 RX ORDER — DOCUSATE SODIUM 100 MG/1
100 CAPSULE, LIQUID FILLED ORAL 2 TIMES DAILY
Status: DISCONTINUED | OUTPATIENT
Start: 2023-10-17 | End: 2023-10-20 | Stop reason: HOSPADM

## 2023-10-17 RX ORDER — MORPHINE SULFATE 0.5 MG/ML
INJECTION, SOLUTION EPIDURAL; INTRATHECAL; INTRAVENOUS AS NEEDED
Status: DISCONTINUED | OUTPATIENT
Start: 2023-10-17 | End: 2023-10-17

## 2023-10-17 RX ORDER — BUPIVACAINE HYDROCHLORIDE 7.5 MG/ML
INJECTION, SOLUTION INTRASPINAL AS NEEDED
Status: DISCONTINUED | OUTPATIENT
Start: 2023-10-17 | End: 2023-10-17

## 2023-10-17 RX ORDER — ONDANSETRON 2 MG/ML
4 INJECTION INTRAMUSCULAR; INTRAVENOUS EVERY 6 HOURS PRN
Status: DISCONTINUED | OUTPATIENT
Start: 2023-10-17 | End: 2023-10-18 | Stop reason: SDUPTHER

## 2023-10-17 RX ORDER — FENTANYL CITRATE 50 UG/ML
INJECTION, SOLUTION INTRAMUSCULAR; INTRAVENOUS AS NEEDED
Status: DISCONTINUED | OUTPATIENT
Start: 2023-10-17 | End: 2023-10-17

## 2023-10-17 RX ORDER — SODIUM CHLORIDE, SODIUM LACTATE, POTASSIUM CHLORIDE, CALCIUM CHLORIDE 600; 310; 30; 20 MG/100ML; MG/100ML; MG/100ML; MG/100ML
125 INJECTION, SOLUTION INTRAVENOUS CONTINUOUS
Status: DISCONTINUED | OUTPATIENT
Start: 2023-10-17 | End: 2023-10-20 | Stop reason: HOSPADM

## 2023-10-17 RX ORDER — ENOXAPARIN SODIUM 100 MG/ML
40 INJECTION SUBCUTANEOUS EVERY 12 HOURS
Status: DISCONTINUED | OUTPATIENT
Start: 2023-10-18 | End: 2023-10-20 | Stop reason: HOSPADM

## 2023-10-17 RX ORDER — HYDROMORPHONE HCL/PF 1 MG/ML
0.5 SYRINGE (ML) INJECTION EVERY 2 HOUR PRN
Status: DISCONTINUED | OUTPATIENT
Start: 2023-10-17 | End: 2023-10-18 | Stop reason: SDUPTHER

## 2023-10-17 RX ORDER — DEXAMETHASONE SODIUM PHOSPHATE 10 MG/ML
INJECTION, SOLUTION INTRAMUSCULAR; INTRAVENOUS AS NEEDED
Status: DISCONTINUED | OUTPATIENT
Start: 2023-10-17 | End: 2023-10-17

## 2023-10-17 RX ORDER — CEFAZOLIN SODIUM 2 G/50ML
2000 SOLUTION INTRAVENOUS ONCE
Status: COMPLETED | OUTPATIENT
Start: 2023-10-17 | End: 2023-10-17

## 2023-10-17 RX ORDER — KETOROLAC TROMETHAMINE 30 MG/ML
15 INJECTION, SOLUTION INTRAMUSCULAR; INTRAVENOUS EVERY 6 HOURS
Status: DISCONTINUED | OUTPATIENT
Start: 2023-10-17 | End: 2023-10-18

## 2023-10-17 RX ORDER — FENTANYL CITRATE/PF 50 MCG/ML
50 SYRINGE (ML) INJECTION
Status: DISCONTINUED | OUTPATIENT
Start: 2023-10-17 | End: 2023-10-20 | Stop reason: HOSPADM

## 2023-10-17 RX ORDER — KETOROLAC TROMETHAMINE 30 MG/ML
INJECTION, SOLUTION INTRAMUSCULAR; INTRAVENOUS AS NEEDED
Status: DISCONTINUED | OUTPATIENT
Start: 2023-10-17 | End: 2023-10-17

## 2023-10-17 RX ORDER — NALOXONE HYDROCHLORIDE 0.4 MG/ML
0.1 INJECTION, SOLUTION INTRAMUSCULAR; INTRAVENOUS; SUBCUTANEOUS
Status: ACTIVE | OUTPATIENT
Start: 2023-10-17 | End: 2023-10-18

## 2023-10-17 RX ORDER — DIAPER,BRIEF,INFANT-TODD,DISP
1 EACH MISCELLANEOUS DAILY PRN
Status: DISCONTINUED | OUTPATIENT
Start: 2023-10-17 | End: 2023-10-20 | Stop reason: HOSPADM

## 2023-10-17 RX ORDER — CALCIUM CARBONATE 500 MG/1
1000 TABLET, CHEWABLE ORAL DAILY PRN
Status: DISCONTINUED | OUTPATIENT
Start: 2023-10-17 | End: 2023-10-20 | Stop reason: HOSPADM

## 2023-10-17 RX ORDER — METRONIDAZOLE 500 MG/1
500 TABLET ORAL EVERY 12 HOURS SCHEDULED
Status: DISCONTINUED | OUTPATIENT
Start: 2023-10-17 | End: 2023-10-20 | Stop reason: HOSPADM

## 2023-10-17 RX ORDER — SODIUM CHLORIDE, SODIUM LACTATE, POTASSIUM CHLORIDE, CALCIUM CHLORIDE 600; 310; 30; 20 MG/100ML; MG/100ML; MG/100ML; MG/100ML
20 INJECTION, SOLUTION INTRAVENOUS CONTINUOUS
Status: DISCONTINUED | OUTPATIENT
Start: 2023-10-17 | End: 2023-10-20 | Stop reason: HOSPADM

## 2023-10-17 RX ORDER — DIPHENHYDRAMINE HCL 25 MG
25 TABLET ORAL EVERY 6 HOURS PRN
Status: DISCONTINUED | OUTPATIENT
Start: 2023-10-17 | End: 2023-10-20 | Stop reason: HOSPADM

## 2023-10-17 RX ORDER — SODIUM CHLORIDE, SODIUM LACTATE, POTASSIUM CHLORIDE, CALCIUM CHLORIDE 600; 310; 30; 20 MG/100ML; MG/100ML; MG/100ML; MG/100ML
INJECTION, SOLUTION INTRAVENOUS CONTINUOUS PRN
Status: DISCONTINUED | OUTPATIENT
Start: 2023-10-17 | End: 2023-10-17

## 2023-10-17 RX ORDER — OXYCODONE HYDROCHLORIDE 5 MG/1
5 TABLET ORAL EVERY 4 HOURS PRN
Status: DISCONTINUED | OUTPATIENT
Start: 2023-10-17 | End: 2023-10-20 | Stop reason: HOSPADM

## 2023-10-17 RX ORDER — ONDANSETRON 2 MG/ML
4 INJECTION INTRAMUSCULAR; INTRAVENOUS EVERY 8 HOURS PRN
Status: DISCONTINUED | OUTPATIENT
Start: 2023-10-17 | End: 2023-10-17

## 2023-10-17 RX ADMIN — BUPIVACAINE HYDROCHLORIDE IN DEXTROSE 1.4 ML: 7.5 INJECTION, SOLUTION SUBARACHNOID at 08:36

## 2023-10-17 RX ADMIN — NALBUPHINE HYDROCHLORIDE 5 MG: 10 INJECTION, SOLUTION INTRAMUSCULAR; INTRAVENOUS; SUBCUTANEOUS at 15:58

## 2023-10-17 RX ADMIN — FENTANYL CITRATE 15 MCG: 50 INJECTION INTRAMUSCULAR; INTRAVENOUS at 08:36

## 2023-10-17 RX ADMIN — KETOROLAC TROMETHAMINE 15 MG: 30 INJECTION INTRAMUSCULAR; INTRAVENOUS at 15:52

## 2023-10-17 RX ADMIN — SODIUM CHLORIDE, SODIUM LACTATE, POTASSIUM CHLORIDE, AND CALCIUM CHLORIDE 125 ML/HR: .6; .31; .03; .02 INJECTION, SOLUTION INTRAVENOUS at 07:45

## 2023-10-17 RX ADMIN — SODIUM CHLORIDE, SODIUM LACTATE, POTASSIUM CHLORIDE, AND CALCIUM CHLORIDE 125 ML/HR: .6; .31; .03; .02 INJECTION, SOLUTION INTRAVENOUS at 17:21

## 2023-10-17 RX ADMIN — KETOROLAC TROMETHAMINE 15 MG: 30 INJECTION, SOLUTION INTRAMUSCULAR; INTRAVENOUS at 09:14

## 2023-10-17 RX ADMIN — DOCUSATE SODIUM 100 MG: 100 CAPSULE, LIQUID FILLED ORAL at 18:19

## 2023-10-17 RX ADMIN — AZITHROMYCIN MONOHYDRATE 500 MG: 500 INJECTION, POWDER, LYOPHILIZED, FOR SOLUTION INTRAVENOUS at 08:31

## 2023-10-17 RX ADMIN — ACETAMINOPHEN 650 MG: 325 TABLET, FILM COATED ORAL at 14:17

## 2023-10-17 RX ADMIN — SODIUM CHLORIDE, SODIUM LACTATE, POTASSIUM CHLORIDE, AND CALCIUM CHLORIDE: .6; .31; .03; .02 INJECTION, SOLUTION INTRAVENOUS at 08:27

## 2023-10-17 RX ADMIN — CEFAZOLIN SODIUM 2000 MG: 2 SOLUTION INTRAVENOUS at 08:31

## 2023-10-17 RX ADMIN — METOCLOPRAMIDE 10 MG: 5 INJECTION, SOLUTION INTRAMUSCULAR; INTRAVENOUS at 09:15

## 2023-10-17 RX ADMIN — SODIUM CHLORIDE, SODIUM LACTATE, POTASSIUM CHLORIDE, AND CALCIUM CHLORIDE 999 ML/HR: .6; .31; .03; .02 INJECTION, SOLUTION INTRAVENOUS at 11:13

## 2023-10-17 RX ADMIN — ONDANSETRON 4 MG: 2 INJECTION INTRAMUSCULAR; INTRAVENOUS at 08:30

## 2023-10-17 RX ADMIN — METRONIDAZOLE 500 MG: 500 TABLET ORAL at 14:20

## 2023-10-17 RX ADMIN — KETOROLAC TROMETHAMINE 15 MG: 30 INJECTION INTRAMUSCULAR; INTRAVENOUS at 21:06

## 2023-10-17 RX ADMIN — ACETAMINOPHEN 650 MG: 325 TABLET, FILM COATED ORAL at 20:03

## 2023-10-17 RX ADMIN — NALBUPHINE HYDROCHLORIDE 5 MG: 10 INJECTION, SOLUTION INTRAMUSCULAR; INTRAVENOUS; SUBCUTANEOUS at 10:05

## 2023-10-17 RX ADMIN — Medication 62.5 MILLI-UNITS/MIN: at 11:07

## 2023-10-17 RX ADMIN — MORPHINE SULFATE 0.1 MG: 0.5 INJECTION, SOLUTION EPIDURAL; INTRATHECAL; INTRAVENOUS at 08:36

## 2023-10-17 RX ADMIN — SODIUM CHLORIDE, SODIUM LACTATE, POTASSIUM CHLORIDE, AND CALCIUM CHLORIDE 999 ML/HR: .6; .31; .03; .02 INJECTION, SOLUTION INTRAVENOUS at 10:05

## 2023-10-17 RX ADMIN — PHENYLEPHRINE HYDROCHLORIDE 50 MCG/MIN: 10 INJECTION INTRAVENOUS at 08:38

## 2023-10-17 RX ADMIN — DEXAMETHASONE SODIUM PHOSPHATE 10 MG: 10 INJECTION, SOLUTION INTRAMUSCULAR; INTRAVENOUS at 09:16

## 2023-10-17 RX ADMIN — Medication 30 UNITS: at 09:00

## 2023-10-17 RX ADMIN — SODIUM CHLORIDE, SODIUM LACTATE, POTASSIUM CHLORIDE, AND CALCIUM CHLORIDE 1000 ML: .6; .31; .03; .02 INJECTION, SOLUTION INTRAVENOUS at 06:15

## 2023-10-17 NOTE — ANESTHESIA PROCEDURE NOTES
Spinal Block    Patient location during procedure: OB  Start time: 10/17/2023 8:36 AM  Reason for block: procedure for pain and at surgeon's request  Staffing  Performed by: Ela Valle MD  Authorized by: Ela Valle MD    Preanesthetic Checklist  Completed: patient identified, IV checked, site marked, risks and benefits discussed, surgical consent, monitors and equipment checked, pre-op evaluation and timeout performed  Spinal Block  Patient position: sitting  Prep: ChloraPrep  Patient monitoring: cardiac monitor and frequent blood pressure checks  Approach: midline  Location: L3-4  Injection technique: single-shot  Needle  Needle type: pencil-tip   Needle gauge: 25 G  Needle length: 10 cm  Assessment  Sensory level: T4  Injection Assessment:  negative aspiration for heme, no paresthesia on injection and positive aspiration for clear CSF.   Post-procedure:  site cleaned

## 2023-10-17 NOTE — DISCHARGE SUMMARY
Discharge Summary - Carlos Medrano 34 y.o. female MRN: [id number]    Unit/Bed#: LD PACU- Encounter: [id number]    Admission Date: 10/17/2023     Discharge Date: 10/20/23    Admitting Diagnosis:   Patient Active Problem List   Diagnosis    Elevated glucose tolerance test    History of  section    Status post repeat low transverse  section    Maternal morbid obesity, antepartum ([address])    H/O macrosomia in infant in prior pregnancy, currently pregnant    Nuchal fold thickening on prenatal ultrasound    Ovarian cyst    Tachycardia    Need for vaccination       Discharge Diagnosis:   Same, delivered    Procedures:   RLTCS    Admitting Attending: Dr. Artemio Tao MD  Delivery Attending: Dr. Artemio Tao MD  Discharge Attending: Dr. Navin Tucker Course:     Carlos Medrano is a 34 y.o. [id number] who was admitted at 39w2d with contractions on the morning of her scheduled RLTCS. She delivered a viable female  on 10/17/23 at 0900. Weight 9lbs 9.6oz via  RLTCS . Apgars were 9 (1 min) and 9 (5 min).  was transferred to  nursery. Patient tolerated the procedure well and was transferred to recovery in stable condition. Her post-partum course was uncomplicated. Her post-partum pain was well controlled with oral analgesics. The patient's post partum course was unremarkable. On day of discharge, she was ambulating and able to reasonably perform all ADLs. She was voiding and had appropriate bowel function. Pain was well controlled. She was discharged home on postpartum day #3 without complications. Patient was instructed to follow up with her OB as an outpatient and was given appropriate warnings to call doctor or provider if she develops signs of infection or uncontrolled pain. On day of discharge she was ambulating, voiding spontaneously, tolerating oral intake and hemodynamically stable.  Mom's blood type is O positive and  Rhogam was not given.    Disposition: Home    Planned Readmission: No    Discharge Medications:   Prenatal vitamin daily for 6 months or the duration of nursing, whichever is longer. Motrin 600 mg orally every 6 hours as needed for pain  Tylenol (over the counter) per bottle directions as needed for pain  Hydrocortisone cream 1% (over the counter) applied 1-2x daily to perineum as needed  Witch hazel pads for perineal discomfort as needed      Discharge instructions :   -Do not place anything (no partner, tampons or douche) in your vagina for 6 weeks. -You may walk for exercise for the first 6 weeks then gradually return to your usual activities.   -Please do not drive for 1 week if you have no stitches and for 2 weeks if you have stitches or underwent a  delivery.    -You may take baths or shower per your preference.   -Please look at your bust (breasts) in the mirror daily and call your doctor for redness or tenderness or increased warmth. - If you have had a  section please look at your incision daily as well and call provider for increasing redness or steady drainage from the incision.   -Please call your doctor's office if temperature > 100.4*F or 38* C, worsening pain or a foul discharge.     Follow Up:  - Follow up in 1 weeks for incision check    Farzana Ocampo MD  OBGYN, PGY-1  10/20/2023  7:58 AM

## 2023-10-17 NOTE — INTERIM OP NOTE
SECTION () REPEAT  Postoperative Note  PATIENT NAME: Carlo Rueda  : 1990  MRN: [id number]  [facility] OR ROOM 01    Surgery Date: 10/17/2023    Preop Diagnosis:  Previous  section complicating pregnancy [F88.608]  Request for sterilization [Z30.2]    Post-Op Diagnosis Codes:     * Previous  section complicating pregnancy [D05.409]     * Request for sterilization [Z30.2]    Procedure(s) (LRB):   SECTION () REPEAT (N/A)    Surgeon(s) and Role:     * Mehul Bhagat MD - Primary     * Rivka Ramirez MD - Assisting    Specimens:  ID Type Source Tests Collected by Time Destination   A :  Cord Blood Cord BLOOD GAS, VENOUS, CORD Mehul Bhagat MD 10/17/2023 0902    B :  Cord Blood Cord BLOOD GAS, ARTERIAL, CORD Mehul Bhagat MD 10/17/2023 0902    C :  Tissue (Placenta on Hold) OB Only Placenta PLACENTA IN STORAGE Mehul Bhagat MD 10/17/2023 0902        Surgical QBL:   No data recorded   ml    Anesthesia Type:   Spinal    Findings:   VTX, clear fluid, viable female . Surgically absent right tube(HX ruptured ectopic)  Normal appendix. No pelvic mass found. Normal ovaries. Normal left tube. Normal uterus.   Complications:   None      SIGNATURE: Mehul Bhagat MD   DATE: 2023   TIME: 9:26 AM

## 2023-10-17 NOTE — OP NOTE
OPERATIVE REPORT  PATIENT NAME: Carleen Hawthorne    :  1990  MRN: [id number]  Pt Location: [facility] OR ROOM 01    SURGERY DATE: 10/17/2023    Surgeon(s) and Role:     * Chico Rachel MD - Primary     * Enrique Closs, MD - Assisting    Preop Diagnosis:  Previous  section complicating pregnancy [K72.145]  Pregnancy at 39w2d  Gestational diabetes  BMI 44  Adnexal cyst    Postop Diagnosis:  Same, delivered    Procedure(s) (LRB):   SECTION () REPEAT (N/A)    Specimen(s):  ID Type Source Tests Collected by Time Destination   A :  Cord Blood Cord BLOOD GAS, VENOUS, CORD Chico Rachel MD 10/17/2023 0902    B :  Cord Blood Cord BLOOD GAS, ARTERIAL, CORD Chico Rachel MD 10/17/2023 0902    C :  Tissue (Placenta on Hold) OB Only Placenta PLACENTA IN STORAGE Chico Rachel MD 10/17/2023 0902        Surgical QBL:  Surgical QBL (mL): 568 mL       Bashir Group Classification System:  No Multiple pregnancy, No Transverse or oblique lie, No Breech lie, Gestational age is > or =37 weeks, Multiparous, Previous uterine scar +  is BASHIR GROUP 5    Brief History  Carleen Hawthorne is a 34 y.o. [id number] at 39w2d admitted with contractions on the morning of her scheduled RLTCS. Operative Indications:  Scheduled repeat low transverse  section    Attending Surgeon: Dr. Chico Rachel  Resident Surgeon: Dr. Enrique Closs    Operative Findings:  1. Viable female  at 45w4d with APGARs of 9 and 9 at 1 and 5 minutes. Fetus weighted 9lb 9.6oz.  2. Clear amniotic fluid. 3. Placenta with a normally inserted 3VC, delivered intact. 4. Normal uterus, bilateral ovaries and left tube. Absent right tube. No cystic structures seen in adnexa or posterior cul de sac. 5. Adhesions present in subcutaneous tissue and between rectus, peritoneum and fascia. 6. Normal appendix.     Umbilical Cord Venous Blood Gas:  Results from last 7 days   Lab Units 10/17/23  0902   PH COV  7.323   PCO2 COV mm HG 43.4*   HCO3 COV mmol/L 22.0   BASE EXC COV mmol/L -4.0*   O2 CT CD VB mL/dL 11.2   O2 HGB, VENOUS CORD % 53.1     Umbilical Cord Arterial Blood Gas:  Results from last 7 days   Lab Units 10/17/23  0902   PH COA  7.242   PCO2 COA  61.2*   PO2 COA mm HG 12.5   HCO3 COA mmol/L 25.8   BASE EXC COA mmol/L -3.0*   O2 CONTENT CORD ART ml/dl 4.1   O2 HGB, ARTERIAL CORD % 17.9       Operative Technique  The patient was taken to the operating room. Spinal anesthesia was adequately established and ancef/azithromycin were given for preoperative prophylaxis. The patient was then placed in the dorsal supine position with a left tilt of the hips. The patient was then prepped with chlorhexidine for vaginal prep and a Davenport catheter was placed. ChloraPrep was then used for the abdomen and allowed to dry prior to draping the patient for a Pfannenstiel skin incision. A time out was performed to confirm correct patient and correct procedure. An incision was made in the skin with a surgical scalpel and sharp dissection was carried out over subsequent layers of tissue including the fascia, followed by the Bovie electrocautery for hemostasis. The fascia was incised to expose the rectus and extended bilaterally using the curved Grady scissors. The superior edge of the fascial incision was grasped with Kocher clamps, elevated and the underlying rectus muscles were dissected off bluntly and sharply using the scalpel. The rectus muscles were divided at the midline and the peritoneum was entered using blunt dissection and then extended longitudinally. Care was taken to avoid the bladder. The Ang-O retractor was inserted and a transverse incision was made in the lower uterine segment using a new surgical blade. The uterine incision was extended cephalad with blunt dissection and the amniotic sac was entered. The surgeon's hand was placed into the uterine cavity.  The fetal head was identified and elevated through the uterine incision with the assistance of fundal pressure. With gentle traction, the shoulder was delivered, followed by the rest of the fetal body. There was no nuchal cord noted. On delivery the cord was doubly clamped and cut after delayed cord clamping. The infant was then passed off the table to the awaiting  staff. Venous and arterial blood gas, cord blood, and portion of cord was obtained for analysis and routine blood testing. The placenta was delivered intact with a 3 vessel cord and sent to storage. Oxytocin was administered by IV infusion to enhance uterine contraction. The uterus was left in situ and cleared of all clots and remaining products of conception. The uterine incision was re approximated using an 0 Monocryl in a running locked fashion. A second horizontal imbricating stitch with the same suture was applied. The uterine incision was examined and noted to be hemostatic. The posterior cul-de-sac was cleared of all clots and products of conception. The paracolic gutters were cleared of all clots. The uterine incision was once again reexamined and noted to be hemostatic. The fascia was re approximated using an 0 Vicryl in a running nonlocked fashion. The subcutaneous tissue was irrigated and cleared of all clots and debris. Good hemostasis was achieved with Bovie electrocautery. The subcutaneous tissue was reapproximated with two layers of 2-0 plain gut suture. The skin incision was closed using 4-0 Monocryl with exofin. Good hemostasis was noted. Patient tolerated the procedure well. All needle, sponge, and instrument counts were noted to be correct x 2 at the end of the procedure. Patient was transferred to recovery in stable condition. Dr. Richelle Padilla was present and participated in the entire surgery.             SIGNATURE: Michael Varela MD  DATE: 2023  TIME: 9:46 AM

## 2023-10-17 NOTE — PLAN OF CARE
Problem: PAIN - ADULT  Goal: Verbalizes/displays adequate comfort level or baseline comfort level  Description: Interventions:  - Encourage patient to monitor pain and request assistance  - Assess pain using appropriate pain scale  - Administer analgesics based on type and severity of pain and evaluate response  - Implement non-pharmacological measures as appropriate and evaluate response  - Consider cultural and social influences on pain and pain management  - Notify physician/advanced practitioner if interventions unsuccessful or patient reports new pain  Outcome: Progressing     Problem: INFECTION - ADULT  Goal: Absence or prevention of progression during hospitalization  Description: INTERVENTIONS:  - Assess and monitor for signs and symptoms of infection  - Monitor lab/diagnostic results  - Monitor all insertion sites, i.e. indwelling lines, tubes, and drains  - Prosperity appropriate cooling/warming therapies per order  - Administer medications as ordered  - Instruct and encourage patient and family to use good hand hygiene technique  Outcome: Progressing  Goal: Absence of fever/infection during neutropenic period  Description: INTERVENTIONS:  - Monitor WBC    Outcome: Progressing     Problem: SAFETY ADULT  Goal: Patient will remain free of falls  Description: INTERVENTIONS:  - Educate patient/family on patient safety including physical limitations  - Instruct patient to call for assistance with activity   - Keep Call bell within reach  - Keep bed low and locked with side rails adjusted as appropriate  - Keep care items and personal belongings within reach  - Initiate and maintain comfort rounds  - Make Fall Risk Sign visible to staff  - Offer Toileting every in advance of need    Outcome: Progressing     Problem: DISCHARGE PLANNING  Goal: Discharge to home or other facility with appropriate resources  Description: INTERVENTIONS:  - Identify barriers to discharge w/patient and caregiver  - Arrange for needed discharge resources and transportation as appropriate  - Identify discharge learning needs (meds, wound care, etc.)  - Refer to Case Management Department for coordinating discharge planning if the patient needs post-hospital services based on physician/advanced practitioner order or complex needs related to functional status, cognitive ability, or social support system  Outcome: Progressing     Problem: Knowledge Deficit  Goal: Patient/family/caregiver demonstrates understanding of disease process, treatment plan, medications, and discharge instructions  Description: Complete learning assessment and assess knowledge base.   Interventions:  - Provide teaching at level of understanding  - Provide teaching via preferred learning methods  Outcome: Progressing     Problem: BIRTH - VAGINAL/ SECTION  Goal: Fetal and maternal status remain reassuring during the birth process  Description: INTERVENTIONS:  - Monitor vital signs  - Monitor fetal heart rate  - Monitor uterine activity  - DVT prophylaxis  - Antibiotic prophylaxis  Outcome: Progressing  Goal: Emotionally satisfying birthing experience for mother/fetus  Description: Interventions:  - Assess, plan, implement and evaluate the nursing care given to the patient in labor  - Advocate the philosophy that each childbirth experience is a unique experience and support the family's chosen level of involvement and control during the labor process   - Actively participate in both the patient's and family's teaching of the birth process  - Consider cultural, Mu-ism and age-specific factors and plan care for the patient in labor  Outcome: Progressing

## 2023-10-17 NOTE — PLAN OF CARE
Problem: POSTPARTUM  Goal: Experiences normal postpartum course  Description: INTERVENTIONS:  - Monitor maternal vital signs  - Assess uterine involution and lochia  Outcome: Progressing  Goal: Appropriate maternal -  bonding  Description: INTERVENTIONS:  - Identify family support  - Assess for appropriate maternal/infant bonding   -Encourage maternal/infant bonding opportunities  - Referral to  or  as needed  Outcome: Progressing  Goal: Establishment of infant feeding pattern  Description: INTERVENTIONS:  - Assess breast/bottle feeding  - Refer to lactation as needed  Outcome: Progressing  Goal: Incision(s), wounds(s) or drain site(s) healing without S/S of infection  Description: INTERVENTIONS  - Assess and document dressing, incision, wound bed, drain sites and surrounding tissue  - Provide patient and family education  - Perform skin care/dressing changes every  Outcome: Progressing     Problem: BIRTH - VAGINAL/ SECTION  Goal: Fetal and maternal status remain reassuring during the birth process  Description: INTERVENTIONS:  - Monitor vital signs  - Monitor fetal heart rate  - Monitor uterine activity  - DVT prophylaxis  - Antibiotic prophylaxis  Outcome: Completed  Goal: Emotionally satisfying birthing experience for mother/fetus  Description: Interventions:  - Assess, plan, implement and evaluate the nursing care given to the patient in labor  - Advocate the philosophy that each childbirth experience is a unique experience and support the family's chosen level of involvement and control during the labor process   - Actively participate in both the patient's and family's teaching of the birth process  - Consider cultural, Orthodox and age-specific factors and plan care for the patient in labor  Outcome: Completed
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
## 2023-10-17 NOTE — ANESTHESIA POSTPROCEDURE EVALUATION
Post-Op Assessment Note    CV Status:  Stable  Pain Score: 0    Pain management: adequate     Mental Status:  Alert and awake   Hydration Status:  Euvolemic   PONV Controlled:  Controlled   Airway Patency:  Patent      Post Op Vitals Reviewed: Yes      Staff: CRNA         No notable events documented.     BP   95/51   Temp   97.6   Pulse  95   Resp   21   SpO2   96

## 2023-10-17 NOTE — ANESTHESIA PREPROCEDURE EVALUATION
Procedure:   SECTION () REPEAT (Uterus)  LIGATION/COAGULATION TUBAL (Bilateral: Abdomen)    Relevant Problems   GYN   (+) 39 weeks gestation of pregnancy        Physical Exam    Airway    Mallampati score: III  TM Distance: >3 FB  Neck ROM: full     Dental   No notable dental hx     Cardiovascular  Rhythm: regular, Rate: normal    Pulmonary   Breath sounds clear to auscultation    Other Findings        Anesthesia Plan  ASA Score- 3     Anesthesia Type- spinal with ASA Monitors. Additional Monitors:     Airway Plan:            Plan Factors-Exercise tolerance (METS): >4 METS. Chart reviewed. EKG reviewed. Imaging results reviewed. Existing labs reviewed. Patient summary reviewed. Patient is not a current smoker. Induction-     Postoperative Plan- Plan for postoperative opioid use. Informed Consent- Anesthetic plan and risks discussed with patient. I personally reviewed this patient with the CRNA. Discussed and agreed on the Anesthesia Plan with the CRNA. Tiffanie Leslie

## 2023-10-18 LAB
BASOPHILS # BLD AUTO: 0.05 THOUSANDS/ÂΜL (ref 0–0.1)
BASOPHILS NFR BLD AUTO: 0 % (ref 0–1)
EOSINOPHIL # BLD AUTO: 0.05 THOUSAND/ÂΜL (ref 0–0.61)
EOSINOPHIL NFR BLD AUTO: 0 % (ref 0–6)
ERYTHROCYTE [DISTWIDTH] IN BLOOD BY AUTOMATED COUNT: 15.9 % (ref 11.6–15.1)
HCT VFR BLD AUTO: 28.5 % (ref 34.8–46.1)
HGB BLD-MCNC: 9.1 G/DL (ref 11.5–15.4)
IMM GRANULOCYTES # BLD AUTO: 0.12 THOUSAND/UL (ref 0–0.2)
IMM GRANULOCYTES NFR BLD AUTO: 1 % (ref 0–2)
LYMPHOCYTES # BLD AUTO: 4.74 THOUSANDS/ÂΜL (ref 0.6–4.47)
LYMPHOCYTES NFR BLD AUTO: 26 % (ref 14–44)
MCH RBC QN AUTO: 26.3 PG (ref 26.8–34.3)
MCHC RBC AUTO-ENTMCNC: 31.9 G/DL (ref 31.4–37.4)
MCV RBC AUTO: 82 FL (ref 82–98)
MONOCYTES # BLD AUTO: 0.92 THOUSAND/ÂΜL (ref 0.17–1.22)
MONOCYTES NFR BLD AUTO: 5 % (ref 4–12)
NEUTROPHILS # BLD AUTO: 12.69 THOUSANDS/ÂΜL (ref 1.85–7.62)
NEUTS SEG NFR BLD AUTO: 68 % (ref 43–75)
NRBC BLD AUTO-RTO: 0 /100 WBCS
PLATELET # BLD AUTO: 253 THOUSANDS/UL (ref 149–390)
PMV BLD AUTO: 11.2 FL (ref 8.9–12.7)
RBC # BLD AUTO: 3.46 MILLION/UL (ref 3.81–5.12)
WBC # BLD AUTO: 18.57 THOUSAND/UL (ref 4.31–10.16)

## 2023-10-18 PROCEDURE — 99024 POSTOP FOLLOW-UP VISIT: CPT | Performed by: OBSTETRICS & GYNECOLOGY

## 2023-10-18 PROCEDURE — 85025 COMPLETE CBC W/AUTO DIFF WBC: CPT

## 2023-10-18 RX ORDER — ONDANSETRON 2 MG/ML
4 INJECTION INTRAMUSCULAR; INTRAVENOUS EVERY 8 HOURS PRN
Status: DISCONTINUED | OUTPATIENT
Start: 2023-10-18 | End: 2023-10-20 | Stop reason: HOSPADM

## 2023-10-18 RX ORDER — LIDOCAINE 50 MG/G
1 PATCH TOPICAL AS NEEDED
Status: DISCONTINUED | OUTPATIENT
Start: 2023-10-18 | End: 2023-10-20 | Stop reason: HOSPADM

## 2023-10-18 RX ORDER — IBUPROFEN 600 MG/1
600 TABLET ORAL EVERY 6 HOURS SCHEDULED
Status: DISCONTINUED | OUTPATIENT
Start: 2023-10-18 | End: 2023-10-20 | Stop reason: HOSPADM

## 2023-10-18 RX ORDER — KETOROLAC TROMETHAMINE 30 MG/ML
15 INJECTION, SOLUTION INTRAMUSCULAR; INTRAVENOUS EVERY 6 HOURS SCHEDULED
Status: DISCONTINUED | OUTPATIENT
Start: 2023-10-18 | End: 2023-10-18

## 2023-10-18 RX ORDER — IBUPROFEN 600 MG/1
600 TABLET ORAL EVERY 6 HOURS
Status: DISCONTINUED | OUTPATIENT
Start: 2023-10-19 | End: 2023-10-18

## 2023-10-18 RX ORDER — HYDROMORPHONE HCL/PF 1 MG/ML
0.5 SYRINGE (ML) INJECTION EVERY 2 HOUR PRN
Status: ACTIVE | OUTPATIENT
Start: 2023-10-18 | End: 2023-10-19

## 2023-10-18 RX ADMIN — IBUPROFEN 600 MG: 600 TABLET ORAL at 18:22

## 2023-10-18 RX ADMIN — IRON SUCROSE 200 MG: 20 INJECTION, SOLUTION INTRAVENOUS at 07:58

## 2023-10-18 RX ADMIN — KETOROLAC TROMETHAMINE 15 MG: 30 INJECTION INTRAMUSCULAR; INTRAVENOUS at 03:38

## 2023-10-18 RX ADMIN — ACETAMINOPHEN 650 MG: 325 TABLET, FILM COATED ORAL at 20:55

## 2023-10-18 RX ADMIN — METRONIDAZOLE 500 MG: 500 TABLET ORAL at 09:38

## 2023-10-18 RX ADMIN — ENOXAPARIN SODIUM 40 MG: 40 INJECTION SUBCUTANEOUS at 20:55

## 2023-10-18 RX ADMIN — DOCUSATE SODIUM 100 MG: 100 CAPSULE, LIQUID FILLED ORAL at 18:22

## 2023-10-18 RX ADMIN — ENOXAPARIN SODIUM 40 MG: 40 INJECTION SUBCUTANEOUS at 09:38

## 2023-10-18 RX ADMIN — METRONIDAZOLE 500 MG: 500 TABLET ORAL at 20:55

## 2023-10-18 RX ADMIN — ACETAMINOPHEN 650 MG: 325 TABLET, FILM COATED ORAL at 01:37

## 2023-10-18 RX ADMIN — ACETAMINOPHEN 650 MG: 325 TABLET, FILM COATED ORAL at 14:46

## 2023-10-18 RX ADMIN — OXYCODONE HYDROCHLORIDE 10 MG: 10 TABLET ORAL at 12:10

## 2023-10-18 RX ADMIN — IBUPROFEN 600 MG: 600 TABLET ORAL at 09:38

## 2023-10-18 RX ADMIN — ACETAMINOPHEN 650 MG: 325 TABLET, FILM COATED ORAL at 06:16

## 2023-10-18 RX ADMIN — DOCUSATE SODIUM 100 MG: 100 CAPSULE, LIQUID FILLED ORAL at 09:38

## 2023-10-18 NOTE — PLAN OF CARE
Problem: PAIN - ADULT  Goal: Verbalizes/displays adequate comfort level or baseline comfort level  Description: Interventions:  - Encourage patient to monitor pain and request assistance  - Assess pain using appropriate pain scale  - Administer analgesics based on type and severity of pain and evaluate response  - Implement non-pharmacological measures as appropriate and evaluate response  - Consider cultural and social influences on pain and pain management  - Notify physician/advanced practitioner if interventions unsuccessful or patient reports new pain  Outcome: Progressing     Problem: INFECTION - ADULT  Goal: Absence or prevention of progression during hospitalization  Description: INTERVENTIONS:  - Assess and monitor for signs and symptoms of infection  - Monitor lab/diagnostic results  - Monitor all insertion sites, i.e. indwelling lines, tubes, and drains  - [place] appropriate cooling/warming therapies per order  - Administer medications as ordered  - Instruct and encourage patient and family to use good hand hygiene technique  Outcome: Progressing  Goal: Absence of fever/infection during neutropenic period  Description: INTERVENTIONS:  - Monitor WBC    Outcome: Progressing     Problem: SAFETY ADULT  Goal: Patient will remain free of falls  Description: INTERVENTIONS:  - Educate patient/family on patient safety including physical limitations  - Instruct patient to call for assistance with activity   - Keep Call bell within reach  - Keep bed low and locked with side rails adjusted as appropriate  - Keep care items and personal belongings within reach  - Initiate and maintain comfort rounds  - Make Fall Risk Sign visible to staff  - Offer Toileting every in advance of need    Outcome: Progressing     Problem: DISCHARGE PLANNING  Goal: Discharge to home or other facility with appropriate resources  Description: INTERVENTIONS:  - Identify barriers to discharge w/patient and caregiver  - Arrange for needed discharge resources and transportation as appropriate  - Identify discharge learning needs (meds, wound care, etc.)  - Refer to Case Management Department for coordinating discharge planning if the patient needs post-hospital services based on physician/advanced practitioner order or complex needs related to functional status, cognitive ability, or social support system  Outcome: Progressing     Problem: Knowledge Deficit  Goal: Patient/family/caregiver demonstrates understanding of disease process, treatment plan, medications, and discharge instructions  Description: Complete learning assessment and assess knowledge base.   Interventions:  - Provide teaching at level of understanding  - Provide teaching via preferred learning methods  Outcome: Progressing     Problem: POSTPARTUM  Goal: Experiences normal postpartum course  Description: INTERVENTIONS:  - Monitor maternal vital signs  - Assess uterine involution and lochia  Outcome: Progressing  Goal: Appropriate maternal -  bonding  Description: INTERVENTIONS:  - Identify family support  - Assess for appropriate maternal/infant bonding   -Encourage maternal/infant bonding opportunities  - Referral to  or  as needed  Outcome: Progressing  Goal: Establishment of infant feeding pattern  Description: INTERVENTIONS:  - Assess breast/bottle feeding  - Refer to lactation as needed  Outcome: Progressing  Goal: Incision(s), wounds(s) or drain site(s) healing without S/S of infection  Description: INTERVENTIONS  - Assess and document dressing, incision, wound bed, drain sites and surrounding tissue  - Provide patient and family education    Outcome: Progressing

## 2023-10-18 NOTE — QUICK NOTE
Patient seen and examined at bedside in NAD, AAOx3. Expresses feeling hot while performing skin to skin and breastfeeding as she doesn't keep the fan on while interacting with her baby. On PE heart rate was tachycardic to 110s with regular rhythm, no MRG. Lung sounds were clear to auscultation in all lung fields. Abdomin was soft and mildly tender to palpation. Incision site is clean, dry, and intact, patient reports pain as tolerable. Skin was warm and mildly diaphoretic. No significant pedal edema. [place] in place with adequate urine output. Denies any chest pain, SOB, headache, blurred vision, or altered mental status.    Advised patient to increase PO hydration to account for insensible losses  Considering IV fluids if tachycardia is sustained  Encourage patient to get up and out of bed as tolerated to stimulate bowel and bladder habits

## 2023-10-18 NOTE — PROGRESS NOTES
Progress Note - OB/GYN  Hong Mac 34 y.o. female MRN: [id number]  Unit/Bed#:  324-01 Encounter: [id number]    Assessment and Plan     Hong Mac is a patient of: [address] [place] [address]. She is POD# 1 s/p  repeat  section, low transverse incision  Recovering well and is stable       * Status post repeat low transverse  section  Assessment & Plan  QBL: 588mL; Hemoglobin 11.1g/dL --> 9.1g/dL, venofer ordered  Pain regimen: s/p Duramorph; stacy Tylenol, stacy Toradol->Motrin  Continue bowel regimen  VTE chemoprophylaxis: Lovenox 40mg BID  Sow removed 2100 last night, f/u [place], patient states has gotten up once to urinate so far  Encourage ambulation and breastfeeding/pumping  Contraception:  to get vasectomy, declines bridge        Disposition    - Anticipate discharge home on PPD# 2-4      Subjective/Objective     Chief Complaint: Postpartum State     Subjective:    Hong Mac is POD#1 s/p  repeat  section, low transverse incision. Overnight, patient felt hot while performing skin to skin and breastfeeding and was found to be tachycardic to 110s. She has been afebrile and feels much better this morning. Patient complains of pain while getting up and is planning to use the abdominal binder and is interested in lidocaine patch. Patient's sow was removed last night, she reports she has voided once since removal.  She is ambulating. Patient is currently passing flatus and has had no bowel movement. She is tolerating PO, and denies nausea or vomitting. Patient denies fever, chills, chest pain, shortness of breath, or calf tenderness. Lochia is normal. She is breastfeeding. She is recovering well and is stable.        Vitals:   /70 (BP Location: Right arm)   Pulse 101   Temp 98 °F (36.7 °C) (Oral)   Resp 20   Ht 5' (1.524 m)   Wt 103 kg (226 lb)   LMP 01/15/2023 (Exact Date)   SpO2 98%   Breastfeeding Yes   BMI 44.14 kg/m² Intake/Output Summary (Last 24 hours) at 10/18/2023 0602  Last data filed at 10/18/2023 0100  Gross per 24 hour   Intake 1050 ml   Output 2093 ml   Net -1043 ml       Invasive Devices       Peripheral Intravenous Line  Duration             Peripheral IV 10/17/23 Left;Dorsal (posterior) Hand <1 day                    Physical Exam:   GEN: Sandra Lucia appears well, alert and oriented x 3, pleasant and cooperative   CARDIO: RRR, no murmurs or rubs  RESP:  CTAB, no wheezes or rales  ABDOMEN: soft, no tenderness, no distention, fundus @ umbilicus, Incision C/D/I  EXTREMITIES: SCDs on, non tender, no erythema      Labs:     Hemoglobin   Date Value Ref Range Status   10/18/2023 9.1 (L) 11.5 - 15.4 g/dL Final   10/17/2023 11.1 (L) 11.5 - 15.4 g/dL Final     WBC   Date Value Ref Range Status   10/18/2023 18.57 (H) 4.31 - 10.16 Thousand/uL Final   10/17/2023 11.87 (H) 4.31 - 10.16 Thousand/uL Final     Platelets   Date Value Ref Range Status   10/18/2023 253 149 - 390 Thousands/uL Final   10/17/2023 294 149 - 390 Thousands/uL Final          Wilson Chavis MD  10/18/2023  6:02 AM

## 2023-10-18 NOTE — UTILIZATION REVIEW
NOTIFICATION OF INPATIENT ADMISSION   MATERNITY/DELIVERY AUTHORIZATION REQUEST   SERVICING FACILITY:   [address] [place] [address] - L&D, , NICU  [facility]. [address]  Tax ID: [id number]  NPI: [id number]   ATTENDING PROVIDER:  Attending Name and NPI#: Jas Bryson Md [[id number]]  Address: [address] [place] [address]. [facility], [address] [place] [address]  Phone: [phone]   ADMISSION INFORMATION:  Place of Service: Inpatient [facility]  Place of Service Code: 21  Inpatient Admission Date/Time: 10/17/23  5:16 AM  Discharge Date/Time: No discharge date for patient encounter. Admitting Diagnosis Code/Description:  Pregnant [Z34.90]  Uterine contractions [O47.9]  39 weeks gestation of pregnancy [Z3A.39]  Previous  section complicating pregnancy [S88.418]  Encounter for  delivery without indication [O82]     Mother: Lee Harrell 1990 Estimated Date of Delivery: 10/22/23  Delivering clinician: Jas Bryson    OB History          4    Para   2    Term   2            AB   2    Living   2         SAB        IAB   1    Ectopic   1    Multiple   0    Live Births   2                Name & MRN:   Information for the patient's :  Nilesh Plummer Girl [facility]) [[id number]]    Delivery Information:  Sex: female  Delivered 10/17/2023 9:00 AM by , Low Transverse; Gestational Age: 45w4d     Measurements:  Weight: 9 lb 9.6 oz (4355 g); Height: 20.5"    APGAR 1 minute 5 minutes 10 minutes   Totals: 9 9       Birth Information: 34 y.o. female MRN: [id number] Unit/Bed#: -01   Birthweight: No birth weight on file. Gestational Age: <None> Delivery Type:    APGARS Totals:        UTILIZATION REVIEW CONTACT:  Florin Bethea Utilization   Network Utilization Review Department  Phone: [phone]  Fax [phone]  Email: [email or web address]  Contact for approvals/pending authorizations, clinical reviews, and discharge. PHYSICIAN ADVISORY SERVICES:  Medical Necessity Denial & [facility] Review  Phone: [phone]  Fax: [phone]  Email: [email or web address]     DISCHARGE SUPPORT TEAM:  For Patients Discharge Needs & Updates  Phone: [phone] opt. 2 Fax: [phone]  Email: [email or web address]

## 2023-10-18 NOTE — ASSESSMENT & PLAN NOTE
QBL: 588mL;  Hemoglobin 11.1g/dL --> 9.1g/dL, s/p venofer  Pain regimen: s/p Duramorph; stacy Tylenol, stacy Toradol->Motrin, lidocaine patch  Continue bowel regimen  VTE chemoprophylaxis: Lovenox 40mg BID  Voiding spontaneously, s/p sow  Encourage ambulation and breastfeeding/pumping  Contraception:  to get vasectomy, declines bridge

## 2023-10-18 NOTE — PLAN OF CARE
Problem: PAIN - ADULT  Goal: Verbalizes/displays adequate comfort level or baseline comfort level  Description: Interventions:  - Encourage patient to monitor pain and request assistance  - Assess pain using appropriate pain scale  - Administer analgesics based on type and severity of pain and evaluate response  - Implement non-pharmacological measures as appropriate and evaluate response  - Consider cultural and social influences on pain and pain management  - Notify physician/advanced practitioner if interventions unsuccessful or patient reports new pain  Outcome: Progressing     Problem: INFECTION - ADULT  Goal: Absence or prevention of progression during hospitalization  Description: INTERVENTIONS:  - Assess and monitor for signs and symptoms of infection  - Monitor lab/diagnostic results  - Monitor all insertion sites, i.e. indwelling lines, tubes, and drains  - [place] appropriate cooling/warming therapies per order  - Administer medications as ordered  - Instruct and encourage patient and family to use good hand hygiene technique  Outcome: Progressing  Goal: Absence of fever/infection during neutropenic period  Description: INTERVENTIONS:  - Monitor WBC    Outcome: Progressing     Problem: SAFETY ADULT  Goal: Patient will remain free of falls  Description: INTERVENTIONS:  - Educate patient/family on patient safety including physical limitations  - Instruct patient to call for assistance with activity   - Keep Call bell within reach  - Keep bed low and locked with side rails adjusted as appropriate  - Keep care items and personal belongings within reach  - Initiate and maintain comfort rounds  - Make Fall Risk Sign visible to staff  - Offer Toileting every in advance of need    Outcome: Progressing     Problem: DISCHARGE PLANNING  Goal: Discharge to home or other facility with appropriate resources  Description: INTERVENTIONS:  - Identify barriers to discharge w/patient and caregiver  - Arrange for needed discharge resources and transportation as appropriate  - Identify discharge learning needs (meds, wound care, etc.)  - Refer to Case Management Department for coordinating discharge planning if the patient needs post-hospital services based on physician/advanced practitioner order or complex needs related to functional status, cognitive ability, or social support system  Outcome: Progressing     Problem: Knowledge Deficit  Goal: Patient/family/caregiver demonstrates understanding of disease process, treatment plan, medications, and discharge instructions  Description: Complete learning assessment and assess knowledge base.   Interventions:  - Provide teaching at level of understanding  - Provide teaching via preferred learning methods  Outcome: Progressing     Problem: POSTPARTUM  Goal: Experiences normal postpartum course  Description: INTERVENTIONS:  - Monitor maternal vital signs  - Assess uterine involution and lochia  Outcome: Progressing  Goal: Appropriate maternal -  bonding  Description: INTERVENTIONS:  - Identify family support  - Assess for appropriate maternal/infant bonding   -Encourage maternal/infant bonding opportunities  - Referral to  or  as needed  Outcome: Progressing  Goal: Establishment of infant feeding pattern  Description: INTERVENTIONS:  - Assess breast/bottle feeding  - Refer to lactation as needed  Outcome: Progressing  Goal: Incision(s), wounds(s) or drain site(s) healing without S/S of infection  Description: INTERVENTIONS  - Assess and document dressing, incision, wound bed, drain sites and surrounding tissue  - Provide patient and family education  - Perform skin care/dressing changes every  Outcome: Progressing

## 2023-10-19 LAB
DME PARACHUTE DELIVERY DATE ACTUAL: NORMAL
DME PARACHUTE DELIVERY DATE REQUESTED: NORMAL
DME PARACHUTE ITEM DESCRIPTION: NORMAL
DME PARACHUTE ORDER STATUS: NORMAL
DME PARACHUTE SUPPLIER NAME: NORMAL
DME PARACHUTE SUPPLIER PHONE: NORMAL

## 2023-10-19 PROCEDURE — 99024 POSTOP FOLLOW-UP VISIT: CPT | Performed by: OBSTETRICS & GYNECOLOGY

## 2023-10-19 RX ADMIN — IBUPROFEN 600 MG: 600 TABLET ORAL at 23:32

## 2023-10-19 RX ADMIN — ENOXAPARIN SODIUM 40 MG: 40 INJECTION SUBCUTANEOUS at 10:23

## 2023-10-19 RX ADMIN — ACETAMINOPHEN 650 MG: 325 TABLET, FILM COATED ORAL at 20:32

## 2023-10-19 RX ADMIN — METRONIDAZOLE 500 MG: 500 TABLET ORAL at 10:23

## 2023-10-19 RX ADMIN — ENOXAPARIN SODIUM 40 MG: 40 INJECTION SUBCUTANEOUS at 23:07

## 2023-10-19 RX ADMIN — ACETAMINOPHEN 650 MG: 325 TABLET, FILM COATED ORAL at 10:23

## 2023-10-19 RX ADMIN — METRONIDAZOLE 500 MG: 500 TABLET ORAL at 20:32

## 2023-10-19 RX ADMIN — IBUPROFEN 600 MG: 600 TABLET ORAL at 06:14

## 2023-10-19 RX ADMIN — IBUPROFEN 600 MG: 600 TABLET ORAL at 13:15

## 2023-10-19 RX ADMIN — SIMETHICONE 80 MG: 80 TABLET, CHEWABLE ORAL at 10:23

## 2023-10-19 RX ADMIN — ACETAMINOPHEN 650 MG: 325 TABLET, FILM COATED ORAL at 03:26

## 2023-10-19 RX ADMIN — IBUPROFEN 600 MG: 600 TABLET ORAL at 18:17

## 2023-10-19 RX ADMIN — OXYCODONE HYDROCHLORIDE 10 MG: 10 TABLET ORAL at 00:15

## 2023-10-19 RX ADMIN — DOCUSATE SODIUM 100 MG: 100 CAPSULE, LIQUID FILLED ORAL at 10:23

## 2023-10-19 RX ADMIN — DOCUSATE SODIUM 100 MG: 100 CAPSULE, LIQUID FILLED ORAL at 18:18

## 2023-10-19 NOTE — CASE MANAGEMENT
Case Management Progress Note    Patient name Haywood Aschoff  Location  324/-28 MRN [id number]  : 1990 Date 10/19/2023       LOS (days): 2  Geometric Mean LOS (GMLOS) (days):   Days to GMLOS:        OBJECTIVE:        Current admission status: Inpatient  Preferred Pharmacy:   [address] [place] [address] [place], [address] [address]  Phone: [phone] Fax: [phone]    Primary Care Provider: No primary care provider on file. Primary Insurance: [employer]  Secondary Insurance: [employer]    PROGRESS NOTE:    CM received consult for MOB requesting Zomee for home use. Order placed to [employer] via [place]. Pump delivered to bedside by MICHELL.

## 2023-10-19 NOTE — PLAN OF CARE
Problem: PAIN - ADULT  Goal: Verbalizes/displays adequate comfort level or baseline comfort level  Description: Interventions:  - Encourage patient to monitor pain and request assistance  - Assess pain using appropriate pain scale  - Administer analgesics based on type and severity of pain and evaluate response  - Implement non-pharmacological measures as appropriate and evaluate response  - Consider cultural and social influences on pain and pain management  - Notify physician/advanced practitioner if interventions unsuccessful or patient reports new pain  Outcome: Progressing     Problem: INFECTION - ADULT  Goal: Absence or prevention of progression during hospitalization  Description: INTERVENTIONS:  - Assess and monitor for signs and symptoms of infection  - Monitor lab/diagnostic results  - Monitor all insertion sites, i.e. indwelling lines, tubes, and drains  - [place] appropriate cooling/warming therapies per order  - Administer medications as ordered  - Instruct and encourage patient and family to use good hand hygiene technique  Outcome: Progressing  Goal: Absence of fever/infection during neutropenic period  Description: INTERVENTIONS:  - Monitor WBC    Outcome: Progressing     Problem: SAFETY ADULT  Goal: Patient will remain free of falls  Description: INTERVENTIONS:  - Educate patient/family on patient safety including physical limitations  - Instruct patient to call for assistance with activity   - Keep Call bell within reach  - Keep bed low and locked with side rails adjusted as appropriate  - Keep care items and personal belongings within reach  - Initiate and maintain comfort rounds  - Make Fall Risk Sign visible to staff  - Offer Toileting every in advance of need    Outcome: Progressing     Problem: DISCHARGE PLANNING  Goal: Discharge to home or other facility with appropriate resources  Description: INTERVENTIONS:  - Identify barriers to discharge w/patient and caregiver  - Arrange for needed discharge resources and transportation as appropriate  - Identify discharge learning needs (meds, wound care, etc.)  - Refer to Case Management Department for coordinating discharge planning if the patient needs post-hospital services based on physician/advanced practitioner order or complex needs related to functional status, cognitive ability, or social support system  Outcome: Progressing     Problem: Knowledge Deficit  Goal: Patient/family/caregiver demonstrates understanding of disease process, treatment plan, medications, and discharge instructions  Description: Complete learning assessment and assess knowledge base.   Interventions:  - Provide teaching at level of understanding  - Provide teaching via preferred learning methods  Outcome: Progressing     Problem: POSTPARTUM  Goal: Experiences normal postpartum course  Description: INTERVENTIONS:  - Monitor maternal vital signs  - Assess uterine involution and lochia  Outcome: Progressing  Goal: Appropriate maternal -  bonding  Description: INTERVENTIONS:  - Identify family support  - Assess for appropriate maternal/infant bonding   -Encourage maternal/infant bonding opportunities  - Referral to  or  as needed  Outcome: Progressing  Goal: Establishment of infant feeding pattern  Description: INTERVENTIONS:  - Assess breast/bottle feeding  - Refer to lactation as needed  Outcome: Progressing  Goal: Incision(s), wounds(s) or drain site(s) healing without S/S of infection  Description: INTERVENTIONS  - Assess and document dressing, incision, wound bed, drain sites and surrounding tissue  - Provide patient and family education  - Perform skin care/dressing changes every  Outcome: Progressing

## 2023-10-19 NOTE — PROGRESS NOTES
Progress Note - OB/GYN  Michael Brenner 34 y.o. female MRN: [id number]  Unit/Bed#:  324-01 Encounter: [id number]    Assessment and Plan     Michael Brenner is a patient of: [address] [place] [address]. She is POD# 2 s/p  repeat  section, low transverse incision. Recovering well and is stable       * Status post repeat low transverse  section  Assessment & Plan  QBL: 588mL; Hemoglobin 11.1g/dL --> 9.1g/dL, s/p venofer  Pain regimen: s/p Duramorph; stacy Tylenol, stacy Toradol->Motrin, lidocaine patch  Continue bowel regimen  VTE chemoprophylaxis: Lovenox 40mg BID  Voiding spontaneously, s/p sow  Encourage ambulation and breastfeeding/pumping  Contraception:  to get vasectomy, declines bridge        Disposition    - Anticipate discharge home on PPD# 3-4 - baby has elevated bilirubin and will be placed under bili lights in NICU today      Subjective/Objective     Chief Complaint: Postpartum State     Subjective:    Michael Brenner is POD#2 s/p  repeat  section, low transverse incision. Patient continues to report pain but states the abdominal binder is very helpful and is going to try the lidocaine patch today. Patient is voiding spontaneously and ambulating. Patient is currently passing flatus and tolerating PO, and denies nausea or vomitting. She does report constipation and is planning to take Miralax. Patient denies fever, chills, chest pain, shortness of breath, or calf tenderness. Lochia is normal. She is breastfeeding. She is recovering well and is stable.        Vitals:   /64   Pulse 84   Temp 97.7 °F (36.5 °C) (Oral)   Resp 18   Ht 5' (1.524 m)   Wt 103 kg (226 lb)   LMP 01/15/2023 (Exact Date)   SpO2 98%   Breastfeeding Yes   BMI 44.14 kg/m²       Intake/Output Summary (Last 24 hours) at 10/19/2023 0610  Last data filed at 10/18/2023 1215  Gross per 24 hour   Intake --   Output 1250 ml   Net -1250 ml         Physical Exam:   GEN: Michael Brenner appears well, alert and oriented x 3, pleasant and cooperative   CARDIO: RRR, no murmurs or rubs  RESP:  CTAB, no wheezes or rales  ABDOMEN: soft, no tenderness, no distention, fundus @ umbilicus, Incision C/D/I  EXTREMITIES: SCDs on, non tender, no erythema      Labs:     Hemoglobin   Date Value Ref Range Status   10/18/2023 9.1 (L) 11.5 - 15.4 g/dL Final   10/17/2023 11.1 (L) 11.5 - 15.4 g/dL Final     WBC   Date Value Ref Range Status   10/18/2023 18.57 (H) 4.31 - 10.16 Thousand/uL Final   10/17/2023 11.87 (H) 4.31 - 10.16 Thousand/uL Final     Platelets   Date Value Ref Range Status   10/18/2023 253 149 - 390 Thousands/uL Final   10/17/2023 294 149 - 390 Thousands/uL Final          Jeanne Bunch MD  10/19/2023  6:10 AM

## 2023-10-19 NOTE — LACTATION NOTE
This note was copied from a baby's chart. CONSULT - LACTATION  Baby Girl (Robinson Stiles) Ita Ripper 1 days female MRN: [id number]    [address] [facility] NURSERY Room / Bed: (N)/(N) Encounter: [id number]    Maternal Information     MOTHER:  Carlo Rueda  Maternal Age: 34 y.o.   OB History: # 1 - Date: , Sex: None, Weight: None, GA: None, Delivery: None, Apgar1: None, Apgar5: None, Living: None, Birth Comments: None    # 2 - Date: , Sex: None, Weight: None, GA: None, Delivery: None, Apgar1: None, Apgar5: None, Living: None, Birth Comments: None    # 3 - Date: 19, Sex: Female, Weight: None, GA: None, Delivery: , Unspecified, Apgar1: None, Apgar5: None, Living: Living, Birth Comments: None    # 4 - Date: 10/17/23, Sex: Female, Weight: 4355 g (9 lb 9.6 oz), GA: 39w2d, Delivery: , Low Transverse, Apgar1: 9, Apgar5: 9, Living: Living, Birth Comments: None   Previouse breast reduction surgery? No    Lactation history:   Has patient previously breast fed: Yes   How long had patient previously breast fed: 4 months   Previous breast feeding complications:       Past Surgical History:   Procedure Laterality Date     SECTION      LA  DELIVERY ONLY N/A 10/17/2023    Procedure:  SECTION () REPEAT;  Surgeon: Mehul Bhagat MD;  Location: [facility] ;  Service: Obstetrics    SALPINGECTOMY Left         Birth information:  YOB: 2021   Time of birth: 9:00 AM   Sex: female   Delivery type: , Low Transverse   Birth Weight: 4355 g (9 lb 9.6 oz)   Percent of Weight Change: -5%     Gestational Age: 45w4d   [unfilled]    Assessment     Breast and nipple assessment: normal assessment    [place] Assessment: normal assessment    Feeding assessment:  latch not assessed, baby feeding well per mom. LATCH:  Latch:      Audible Swallowing:     Type of Nipple:     Comfort (Breast/Nipple):     Hold (Positioning):     LATCH Score:            Feeding recommendations:   Place baby to the breast every 2-3 hours. Met with Darrell Shelley and provided her with the  Ready Set Baby Booklet which contained information on:  Hand expression with access to QR codes to review hand expression. Positioning and latch reviewed as well as showing images of other feeding positions. Discussed the properties of a good latch in any position. Feeding on cue and what that means for recognizing infant's hunger, s/s that baby is getting enough milk and some s/s that breastfeeding dyad may need further help. Skin to Skin contact and benefits to mom and baby. Avoidance of pacifiers for the first month discussed. Gave information on common concerns, what to expect the first few weeks after delivery, preparing for other caregivers, and how partners can help. Resources for support also provided. Darrell Shelley states that baby has been latching well and that she is breastfeeding every 2-3 hours. Baby is on the Bili Soft, is presently exclusively breastfeeding, has not needed supplementation. Encouraged Darrellpeterson Shelley to call for a latch assessment and with additional questions as needed. The Discharge Breastfeeding Booklet was left at the bedside for review.     No Garcia RN 10/18/2023 9:04 PM

## 2023-10-19 NOTE — LACTATION NOTE
This note was copied from a baby's chart. 10/19/23 1615   Lactation Consultation   Reason for Consult 21 m   Breasts/Nipples   Left Breast Soft   Right Breast Soft   Left Nipple Everted   Right Nipple Everted   Intervention Hand expression   Breastfeeding Status Yes   Breastfeeding Progress Not yet established;Breastfeeding well   Breast Pump   Pump 3   Patient Follow-Up   Lactation Consult Status 2   Follow-Up Type Inpatient;Call as needed   Other OB Lactation Documentation    Additional Problem Noted Mom states baby nursing well. Education on using pump for home     Mom states baby is nursing well. On Bili light but bringing baby to room to nurse. Nursing every 2-3 hours for about 20-30 minutes. Education on proper position and latch. Education and setup of pump for home. Patient received Peace Zimmerman. Pumping:   - When pumping, begin in stimulation mode (high cycle, low vacuum) until milk begins to express. Change pump to expression mode (low cycle, high vacuum). Use hands on pumping techniques to assist with milk transfer. When milk stops expressing, change back to stimulation mode. When milk begins to flow, change to expression mode. You may cycle pump up to three times in a pumping session. Instructions given on pumping. Discussed when to start, frequency, different pumps available versus manual expression. Encouraged parents to call for assistance, questions, and concerns about breastfeeding. Extension provided.

## 2023-10-20 VITALS
TEMPERATURE: 98.3 F | HEART RATE: 88 BPM | HEIGHT: 60 IN | RESPIRATION RATE: 20 BRPM | WEIGHT: 226 LBS | DIASTOLIC BLOOD PRESSURE: 78 MMHG | BODY MASS INDEX: 44.37 KG/M2 | SYSTOLIC BLOOD PRESSURE: 118 MMHG | OXYGEN SATURATION: 98 %

## 2023-10-20 PROCEDURE — 99024 POSTOP FOLLOW-UP VISIT: CPT | Performed by: OBSTETRICS & GYNECOLOGY

## 2023-10-20 PROCEDURE — NC001 PR NO CHARGE: Performed by: OBSTETRICS & GYNECOLOGY

## 2023-10-20 RX ORDER — IBUPROFEN 600 MG/1
600 TABLET ORAL EVERY 6 HOURS SCHEDULED
Qty: 30 TABLET | Refills: 0 | Status: SHIPPED | OUTPATIENT
Start: 2023-10-20

## 2023-10-20 RX ORDER — ACETAMINOPHEN 325 MG/1
650 TABLET ORAL EVERY 6 HOURS SCHEDULED
Refills: 0
Start: 2023-10-20

## 2023-10-20 RX ORDER — POLYETHYLENE GLYCOL 3350 17 G/17G
17 POWDER, FOR SOLUTION ORAL DAILY
Refills: 0
Start: 2023-10-20

## 2023-10-20 RX ORDER — OXYCODONE HYDROCHLORIDE 5 MG/1
5 TABLET ORAL EVERY 4 HOURS PRN
Qty: 5 TABLET | Refills: 0
Start: 2023-10-20 | End: 2023-10-30

## 2023-10-20 RX ADMIN — OXYCODONE HYDROCHLORIDE 10 MG: 10 TABLET ORAL at 15:40

## 2023-10-20 RX ADMIN — DOCUSATE SODIUM 100 MG: 100 CAPSULE, LIQUID FILLED ORAL at 09:17

## 2023-10-20 RX ADMIN — ACETAMINOPHEN 650 MG: 325 TABLET, FILM COATED ORAL at 14:49

## 2023-10-20 RX ADMIN — METRONIDAZOLE 500 MG: 500 TABLET ORAL at 09:17

## 2023-10-20 RX ADMIN — ENOXAPARIN SODIUM 40 MG: 40 INJECTION SUBCUTANEOUS at 09:17

## 2023-10-20 RX ADMIN — IBUPROFEN 600 MG: 600 TABLET ORAL at 12:29

## 2023-10-20 RX ADMIN — IBUPROFEN 600 MG: 600 TABLET ORAL at 06:22

## 2023-10-20 RX ADMIN — ACETAMINOPHEN 650 MG: 325 TABLET, FILM COATED ORAL at 09:17

## 2023-10-20 RX ADMIN — ACETAMINOPHEN 650 MG: 325 TABLET, FILM COATED ORAL at 02:56

## 2023-10-20 NOTE — PLAN OF CARE
Problem: PAIN - ADULT  Goal: Verbalizes/displays adequate comfort level or baseline comfort level  Description: Interventions:  - Encourage patient to monitor pain and request assistance  - Assess pain using appropriate pain scale  - Administer analgesics based on type and severity of pain and evaluate response  - Implement non-pharmacological measures as appropriate and evaluate response  - Consider cultural and social influences on pain and pain management  - Notify physician/advanced practitioner if interventions unsuccessful or patient reports new pain  Outcome: Progressing     Problem: INFECTION - ADULT  Goal: Absence or prevention of progression during hospitalization  Description: INTERVENTIONS:  - Assess and monitor for signs and symptoms of infection  - Monitor lab/diagnostic results  - Monitor all insertion sites, i.e. indwelling lines, tubes, and drains  - [place] appropriate cooling/warming therapies per order  - Administer medications as ordered  - Instruct and encourage patient and family to use good hand hygiene technique  Outcome: Progressing  Goal: Absence of fever/infection during neutropenic period  Description: INTERVENTIONS:  - Monitor WBC    Outcome: Progressing     Problem: SAFETY ADULT  Goal: Patient will remain free of falls  Description: INTERVENTIONS:  - Educate patient/family on patient safety including physical limitations  - Instruct patient to call for assistance with activity   - Keep Call bell within reach  - Keep bed low and locked with side rails adjusted as appropriate  - Keep care items and personal belongings within reach  - Initiate and maintain comfort rounds  - Make Fall Risk Sign visible to staff  - Offer Toileting every in advance of need    Outcome: Progressing     Problem: DISCHARGE PLANNING  Goal: Discharge to home or other facility with appropriate resources  Description: INTERVENTIONS:  - Identify barriers to discharge w/patient and caregiver  - Arrange for needed discharge resources and transportation as appropriate  - Identify discharge learning needs (meds, wound care, etc.)  - Refer to Case Management Department for coordinating discharge planning if the patient needs post-hospital services based on physician/advanced practitioner order or complex needs related to functional status, cognitive ability, or social support system  Outcome: Progressing     Problem: Knowledge Deficit  Goal: Patient/family/caregiver demonstrates understanding of disease process, treatment plan, medications, and discharge instructions  Description: Complete learning assessment and assess knowledge base.   Interventions:  - Provide teaching at level of understanding  - Provide teaching via preferred learning methods  Outcome: Progressing     Problem: POSTPARTUM  Goal: Experiences normal postpartum course  Description: INTERVENTIONS:  - Monitor maternal vital signs  - Assess uterine involution and lochia  Outcome: Progressing  Goal: Appropriate maternal -  bonding  Description: INTERVENTIONS:  - Identify family support  - Assess for appropriate maternal/infant bonding   -Encourage maternal/infant bonding opportunities  - Referral to  or  as needed  Outcome: Progressing  Goal: Establishment of infant feeding pattern  Description: INTERVENTIONS:  - Assess breast/bottle feeding  - Refer to lactation as needed  Outcome: Progressing  Goal: Incision(s), wounds(s) or drain site(s) healing without S/S of infection  Description: INTERVENTIONS  - Assess and document dressing, incision, wound bed, drain sites and surrounding tissue  - Provide patient and family education  - Perform skin care/dressing changes every   Outcome: Progressing

## 2023-10-20 NOTE — PLAN OF CARE
Problem: PAIN - ADULT  Goal: Verbalizes/displays adequate comfort level or baseline comfort level  Description: Interventions:  - Encourage patient to monitor pain and request assistance  - Assess pain using appropriate pain scale  - Administer analgesics based on type and severity of pain and evaluate response  - Implement non-pharmacological measures as appropriate and evaluate response  - Consider cultural and social influences on pain and pain management  - Notify physician/advanced practitioner if interventions unsuccessful or patient reports new pain  Outcome: Progressing     Problem: INFECTION - ADULT  Goal: Absence or prevention of progression during hospitalization  Description: INTERVENTIONS:  - Assess and monitor for signs and symptoms of infection  - Monitor lab/diagnostic results  - Monitor all insertion sites, i.e. indwelling lines, tubes, and drains  - [place] appropriate cooling/warming therapies per order  - Administer medications as ordered  - Instruct and encourage patient and family to use good hand hygiene technique  Outcome: Progressing  Goal: Absence of fever/infection during neutropenic period  Description: INTERVENTIONS:  - Monitor WBC    Outcome: Progressing     Problem: SAFETY ADULT  Goal: Patient will remain free of falls  Description: INTERVENTIONS:  - Educate patient/family on patient safety including physical limitations  - Instruct patient to call for assistance with activity   - Keep Call bell within reach  - Keep bed low and locked with side rails adjusted as appropriate  - Keep care items and personal belongings within reach  - Initiate and maintain comfort rounds  - Make Fall Risk Sign visible to staff  - Offer Toileting every in advance of need    Outcome: Progressing     Problem: DISCHARGE PLANNING  Goal: Discharge to home or other facility with appropriate resources  Description: INTERVENTIONS:  - Identify barriers to discharge w/patient and caregiver  - Arrange for needed discharge resources and transportation as appropriate  - Identify discharge learning needs (meds, wound care, etc.)  - Refer to Case Management Department for coordinating discharge planning if the patient needs post-hospital services based on physician/advanced practitioner order or complex needs related to functional status, cognitive ability, or social support system  Outcome: Progressing     Problem: Knowledge Deficit  Goal: Patient/family/caregiver demonstrates understanding of disease process, treatment plan, medications, and discharge instructions  Description: Complete learning assessment and assess knowledge base.   Interventions:  - Provide teaching at level of understanding  - Provide teaching via preferred learning methods  Outcome: Progressing     Problem: POSTPARTUM  Goal: Experiences normal postpartum course  Description: INTERVENTIONS:  - Monitor maternal vital signs  - Assess uterine involution and lochia  Outcome: Progressing  Goal: Appropriate maternal -  bonding  Description: INTERVENTIONS:  - Identify family support  - Assess for appropriate maternal/infant bonding   -Encourage maternal/infant bonding opportunities  - Referral to  or  as needed  Outcome: Progressing  Goal: Establishment of infant feeding pattern  Description: INTERVENTIONS:  - Assess breast/bottle feeding  - Refer to lactation as needed  Outcome: Progressing  Goal: Incision(s), wounds(s) or drain site(s) healing without S/S of infection  Description: INTERVENTIONS  - Assess and document dressing, incision, wound bed, drain sites and surrounding tissue  - Provide patient and family education  - Perform skin care/dressing changes every   Outcome: Progressing

## 2023-10-20 NOTE — PLAN OF CARE
Problem: PAIN - ADULT  Goal: Verbalizes/displays adequate comfort level or baseline comfort level  Description: Interventions:  - Encourage patient to monitor pain and request assistance  - Assess pain using appropriate pain scale  - Administer analgesics based on type and severity of pain and evaluate response  - Implement non-pharmacological measures as appropriate and evaluate response  - Consider cultural and social influences on pain and pain management  - Notify physician/advanced practitioner if interventions unsuccessful or patient reports new pain  10/20/2023 0935 by Ward Perez RN  Outcome: Completed  10/20/2023 0934 by Ward Perez RN  Outcome: Progressing     Problem: INFECTION - ADULT  Goal: Absence or prevention of progression during hospitalization  Description: INTERVENTIONS:  - Assess and monitor for signs and symptoms of infection  - Monitor lab/diagnostic results  - Monitor all insertion sites, i.e. indwelling lines, tubes, and drains  - [place] appropriate cooling/warming therapies per order  - Administer medications as ordered  - Instruct and encourage patient and family to use good hand hygiene technique  10/20/2023 0935 by Ward Perez RN  Outcome: Completed  10/20/2023 [address] [place] [address] by Ward Perez RN  Outcome: Progressing  Goal: Absence of fever/infection during neutropenic period  Description: INTERVENTIONS:  - Monitor WBC    10/20/2023 0935 by Ward Perez RN  Outcome: Completed  10/20/2023 [address] [place] [address] by Ward Perez RN  Outcome: Progressing     Problem: SAFETY ADULT  Goal: Patient will remain free of falls  Description: INTERVENTIONS:  - Educate patient/family on patient safety including physical limitations  - Instruct patient to call for assistance with activity   - Keep Call bell within reach  - Keep bed low and locked with side rails adjusted as appropriate  - Keep care items and personal belongings within reach  - Initiate and maintain comfort rounds  - Make Fall Risk Sign visible to staff  - Offer Toileting every in advance of need    10/20/2023 0935 by Genie Lyn RN  Outcome: Completed  10/20/2023 0934 by Genie Lyn RN  Outcome: Progressing     Problem: DISCHARGE PLANNING  Goal: Discharge to home or other facility with appropriate resources  Description: INTERVENTIONS:  - Identify barriers to discharge w/patient and caregiver  - Arrange for needed discharge resources and transportation as appropriate  - Identify discharge learning needs (meds, wound care, etc.)  - Refer to Case Management Department for coordinating discharge planning if the patient needs post-hospital services based on physician/advanced practitioner order or complex needs related to functional status, cognitive ability, or social support system  10/20/2023 0935 by Genie Lyn RN  Outcome: Completed  10/20/2023 0934 by Genie Lyn RN  Outcome: Progressing     Problem: Knowledge Deficit  Goal: Patient/family/caregiver demonstrates understanding of disease process, treatment plan, medications, and discharge instructions  Description: Complete learning assessment and assess knowledge base.   Interventions:  - Provide teaching at level of understanding  - Provide teaching via preferred learning methods  10/20/2023 0935 by Genie Lyn RN  Outcome: Completed  10/20/2023 0934 by Genie Lyn RN  Outcome: Progressing     Problem: POSTPARTUM  Goal: Experiences normal postpartum course  Description: INTERVENTIONS:  - Monitor maternal vital signs  - Assess uterine involution and lochia  10/20/2023 0935 by Genie Lyn RN  Outcome: Completed  10/20/2023 0934 by Genie Lyn RN  Outcome: Progressing  Goal: Appropriate maternal -  bonding  Description: INTERVENTIONS:  - Identify family support  - Assess for appropriate maternal/infant bonding   -Encourage maternal/infant bonding opportunities  - Referral to  or  as needed  10/20/2023 0935 by Genie Lyn RN  Outcome: Completed  10/20/2023 6891 by Benjamin Goldsmith RN  Outcome: Progressing  Goal: Establishment of infant feeding pattern  Description: INTERVENTIONS:  - Assess breast/bottle feeding  - Refer to lactation as needed  10/20/2023 0935 by Benjamin Goldsmith RN  Outcome: Completed  10/20/2023 0934 by Benjamin Goldsmith RN  Outcome: Progressing  Goal: Incision(s), wounds(s) or drain site(s) healing without S/S of infection  Description: INTERVENTIONS  - Assess and document dressing, incision, wound bed, drain sites and surrounding tissue  - Provide patient and family education  - Perform skin care/dressing changes every   10/20/2023 0935 by Benjamin Goldsmith RN  Outcome: Completed  10/20/2023 0934 by Benjamin Goldsmith RN  Outcome: Progressing

## 2023-10-20 NOTE — PROGRESS NOTES
Progress Note - OB/GYN  No Wood 34 y.o. female MRN: [id number]  Unit/Bed#:  324-01 Encounter: [id number]    Assessment and Plan     No Wood is a patient of: [facility]. She is POD# 3 s/p  repeat  section, low transverse incision. Recovering well and is stable       * Status post repeat low transverse  section  Assessment & Plan  QBL: 588mL; Hemoglobin 11.1g/dL --> 9.1g/dL, s/p venofer  Pain regimen: s/p Duramorph; stacy Tylenol, stacy Toradol->Motrin, lidocaine patch  Continue bowel regimen  VTE chemoprophylaxis: Lovenox 40mg BID  Voiding spontaneously, s/p sow  Encourage ambulation and breastfeeding/pumping  Contraception:  to get vasectomy, [place]        Disposition    - Anticipate discharge home on PPD# 3-4 - baby has elevated bilirubin and under lights      Subjective/Objective     Chief Complaint: Postpartum State     Subjective:    No Wood is POD#3 s/p  repeat  section, low transverse incision. Pain is well-controlled. Patient is voiding spontaneously and ambulating. Patient is currently passing flatus and tolerating PO, and denies nausea or vomitting. Patient denies fever, chills, chest pain, shortness of breath, or calf tenderness. Lochia is normal. She is breastfeeding and pumping. She is recovering well and is stable.        Vitals:   /80 (BP Location: Right arm)   Pulse 96   Temp 98.2 °F (36.8 °C) (Oral)   Resp 20   Ht 5' (1.524 m)   Wt 103 kg (226 lb)   LMP 01/15/2023 (Exact Date)   SpO2 98%   Breastfeeding Yes   BMI 44.14 kg/m²     No intake or output data in the 24 hours ending 10/20/23 5396      Physical Exam:   GEN: No Wood appears well, alert and oriented x 3, pleasant and cooperative   CARDIO: RRR, no murmurs or rubs  RESP:  CTAB, no wheezes or rales  ABDOMEN: soft, no tenderness, no distention, fundus firm @ umbilicus, Incision C/D/I  EXTREMITIES: SCDs on, non tender, no erythema      Labs:     Hemoglobin   Date Value Ref Range Status   10/18/2023 9.1 (L) 11.5 - 15.4 g/dL Final   10/17/2023 11.1 (L) 11.5 - 15.4 g/dL Final     WBC   Date Value Ref Range Status   10/18/2023 18.57 (H) 4.31 - 10.16 Thousand/uL Final   10/17/2023 11.87 (H) 4.31 - 10.16 Thousand/uL Final     Platelets   Date Value Ref Range Status   10/18/2023 253 149 - 390 Thousands/uL Final   10/17/2023 294 149 - 390 Thousands/uL Final          Gera Benedict MD  10/20/2023  6:26 AM

## 2023-10-20 NOTE — LACTATION NOTE
This note was copied from a baby's chart. Discharge Lactation: Mom states baby is not latching long to the breast. Mom is supplementing with formula via a bottle due to cultural belief that baby is not getting enough and baby is not satisfied at the breast.    Ed. On how to est. Milk supply    Ed. On hand expression and s2s. Enc. Mixed feeding plan of breast - bottle- breast.     Ed. On cultural belief in mal leche. Ed. On signs of satiation and timing of feeds. Demonstration and teach back of paced bottle feeding. Baby took 14 mls. Some still in volume feeder. Ed. On how to pace bottle feed. Reviewed d/c     Received breast pump - reviewed on how to begin using, when to use, and how  est. Supply. Pumping log provided    Provided handout on How to Prepare Formula. Discussed paced bottle feeding method. Discussed types of pacifiers. Encouraged to feed liquid formula for the first 3 months. Handout on how to prepare powder formula if desired. Feed formula via bottle by paced bottle feeding method. . Paced bottle feeding technique is less stressful for your baby, prevents overfeeding and allows you to bond with your baby. You can find a video about paced bottle feeding at www.lacted. org or MilkMob on YouTube. Education on pacifier use. If baby spits out the pacifier, attempt to feed. Use a single molded pacifier to reduce breakage of pieces. Sanitize daily as you would with any other artificial nipple or bottle. Milk Supply:   - Allow for non-nutritive suck at the breast to stimulate supply   - Allow for skin to skin during and after each breastfeeding session   - Use massage, heat, and hand expression prior to feedings to assist with deep latch   - Increase pumping sessions and pump after every feeding    Mom is encouraged to place baby skin to skin for feedings. Skin to skin education provided for baby placement on mother's chest, baby only in diaper, blankets below shoulders on baby's back.  Skin to skin is encouraged to continue at home for feedings and between feedings. Mix Feeds:   Start every feeding at the breast. Offer both breasts or one breast and use breast compressions to achieve active suckling. Once baby is not actively suckling, bring baby in upright position and offer expressed milk and/or artificial supplementation via alternative feeding method (syringe, finger, paced bottle feeding). Burp frequently between breasts and during paced bottle feeding. Once feed is complete, place baby back on breast for on-nutritive suck. Pump after the feeding session to supplement with expressed milk at next feed. Feed expressed milk or formula as needed/desired. Paced bottle feeding technique is less stressful for your baby, prevents overfeeding and protects the breastfeeding relationship. You can find a video about paced bottle feeding at www.lacted. org or MilkMob on YouTube. Discharge feeding plan    1. Meet early feeding cues  2. Use massage, warmth, hand expression to stimulate breasts  3. Bring baby to breast skin to skin  4. Align nipple to nose, chin to breast, (move baby not breast) and bring baby to breast when mouth is wide and deep latch is achieved. (Scan QR code for [address] - positions)  5. Use breast compressions to stimulate suck  6. Once baby does not suck with stimulation, becomes fussy, or un-latches feed expressed milk via paced bottle feeding method. Review Milkmob on youtube or scan QR code for MilkMob video  7. Bring baby back to opposite breast for non-nutritive suck and skin to skin  8.  Pump after each feed to stimulate breasts and have expressed milk for next feed       [address] [facility] - positions

## 2023-10-23 ENCOUNTER — POSTPARTUM VISIT (OUTPATIENT)
Dept: OBGYN CLINIC | Facility: CLINIC | Age: 33
End: 2023-10-23

## 2023-10-23 ENCOUNTER — TELEPHONE (OUTPATIENT)
Dept: OBGYN CLINIC | Facility: CLINIC | Age: 33
End: 2023-10-23

## 2023-10-23 VITALS
HEART RATE: 80 BPM | DIASTOLIC BLOOD PRESSURE: 88 MMHG | SYSTOLIC BLOOD PRESSURE: 130 MMHG | HEIGHT: 60 IN | BODY MASS INDEX: 41.7 KG/M2 | WEIGHT: 212.4 LBS

## 2023-10-23 DIAGNOSIS — Z09 POSTOP CHECK: Primary | ICD-10-CM

## 2023-10-23 LAB — PLACENTA IN STORAGE: NORMAL

## 2023-10-23 NOTE — UTILIZATION REVIEW
Mother and baby discharged on 10/20/2023     NOTIFICATION [address]   This is a Notification of Discharge from [address]. Please be advised that this patient has been discharge from our facility. Below you will find the admission and discharge date and time including the patient’s disposition. UTILIZATION REVIEW CONTACT:  Alan Baer  Utilization   Network Utilization Review Department  Phone: [phone] x carefully listen to the prompts. All voicemails are confidential.  Email: [email or web address]     ADMISSION INFORMATION  PRESENTATION DATE: 10/17/2023  5:16 AM  OBERVATION ADMISSION DATE:   INPATIENT ADMISSION DATE: 10/17/23  5:16 AM   DISCHARGE DATE: 10/20/2023  5:00 PM   DISPOSITION:Home/Self Care    Network Utilization Review Department  ATTENTION: Please call with any questions or concerns to [phone] and carefully listen to the prompts so that you are directed to the right person. All voicemails are confidential.   For Discharge needs, contact Care Management DC Support Team at [phone] opt. 2  Send all requests for admission clinical reviews, approved or denied determinations and any other requests to dedicated fax number below belonging to the campus where the patient is receiving treatment.  List of dedicated fax numbers for the Facilities:  [place] DENIALS (Administrative/Medical Necessity) [phone]   DISCHARGE SUPPORT TEAM (Network) [phone] [address] [facility] (Maternity/NICU/Pediatrics) [phone]   [address] [place] [address]. [facility] [address]  [facility] [phone]

## 2023-10-23 NOTE — TELEPHONE ENCOUNTER
Transition of Care Post Partum phone call: Congratulations.     Date of delivery: 2023  Weeks gestation: full term 39 weeks  Type of delivery:  section  Sex of child: female  Name of child:   Birth weight: 4355 gm   9lbs 9ounces  Perineum laceration: NA   Pediatric provider:  Vaginal bleeding status: none  Urinary status: WNL  Bowel movement: Yes  Incision status: Yes healing well  Pain control: acetaminophen and ibuprofen (OTC)   feeding: Breastfeeding   Any baby blues: No  Scheduled for follow-up appointment: 2023

## 2023-10-23 NOTE — PROGRESS NOTES
Assessment/Plan:    No problem-specific Assessment & Plan notes found for this encounter. Diagnoses and all orders for this visit:    Postop check  Reviewed incision care , can wash with soap and water and pat dry,  do not rub, can also try with hair dryer on low    Return to office in 2 weeks for her 3-week postpartum check. Subjective:      Patient ID: Wilma Geronimo is a 34 y.o. female. HPI 33 yo  here for an incision check. She had a repeat  done 10/17/2023. She had a baby girl  " Samia" that weighed 9 pounds 9.6 ounces, Apgars were 9 and 9. Anxiety complicated by history of , morbid obesity, history of macrosomia prior pregnancy. History of adhesions present in subcutaneous tissue and between rectus,  peritoneum and fascia. Breastfeeding without concerns. Has minimal vaginal bleeding. Using ibuprofen and Tylenol for pain control. Also wearing a abdominal binder. She denies any problems with elimination. Her blood pressure today is 130/88, she denies any headaches visual changes or right upper quadrant pain. Reviewed precautions with patient and to call if she develops any symptoms. The following portions of the patient's history were reviewed and updated as appropriate: allergies, current medications, past family history, past medical history, past social history, past surgical history, and problem list.    Review of Systems   Constitutional:  Negative for chills and fever. Respiratory: Negative. Cardiovascular: Negative. Gastrointestinal:  Negative for abdominal pain, constipation and diarrhea. Genitourinary:  Negative for dysuria, pelvic pain and vaginal discharge. Objective:      /88   Pulse 80   Ht 5' (1.524 m)   Wt 96.3 kg (212 lb 6.4 oz)   LMP 01/15/2023 (Exact Date)   BMI 41.48 kg/m²          Physical Exam  Constitutional:       Appearance: Normal appearance.    Cardiovascular:      Rate and Rhythm: Normal rate and regular rhythm. Pulmonary:      Effort: Pulmonary effort is normal.      Breath sounds: Normal breath sounds. Abdominal:      General: Bowel sounds are normal.      Palpations: Abdomen is soft. Tenderness: There is no abdominal tenderness. There is no guarding. Incision-post, no drainage, glue intact on her incision. No collections palpated no erythema she does have bruising below her umbilicus and slightly in her mons pubis.

## (undated) DEVICE — SUT VICRYL 0 CT-1 27 IN J260H

## (undated) DEVICE — SUT PLAIN 2-0 CTX 27 IN 872H

## (undated) DEVICE — Device

## (undated) DEVICE — SKIN MARKER DUAL TIP WITH RULER CAP, FLEXIBLE RULER AND LABELS: Brand: DEVON

## (undated) DEVICE — TELFA NON-ADHERENT ABSORBENT DRESSING: Brand: TELFA

## (undated) DEVICE — SUT MONOCRYL 4-0 PS-2 27 IN Y426H

## (undated) DEVICE — ADHESIVE SKIN HIGH VISCOSITY EXOFIN 1ML

## (undated) DEVICE — SUT MONOCRYL 0 CTX 36 IN Y398H

## (undated) DEVICE — ABDOMINAL PAD: Brand: DERMACEA

## (undated) DEVICE — GAUZE SPONGES,16 PLY: Brand: CURITY

## (undated) DEVICE — CHLORAPREP HI-LITE 26ML ORANGE

## (undated) DEVICE — GLOVE SRG BIOGEL ECLIPSE 8

## (undated) DEVICE — MEDI-VAC YANKAUER SUCTION HANDLE W/STRAIGHT TIP & CONTROL VENT: Brand: CARDINAL HEALTH

## (undated) DEVICE — SUT VICRYL 4-0 KS 27 IN J662H

## (undated) DEVICE — PACK C-SECTION PBDS